# Patient Record
Sex: FEMALE | Race: WHITE | Employment: FULL TIME | URBAN - METROPOLITAN AREA
[De-identification: names, ages, dates, MRNs, and addresses within clinical notes are randomized per-mention and may not be internally consistent; named-entity substitution may affect disease eponyms.]

---

## 2018-07-30 ENCOUNTER — LAB ENCOUNTER (OUTPATIENT)
Dept: LAB | Age: 33
End: 2018-07-30
Attending: OBSTETRICS & GYNECOLOGY
Payer: COMMERCIAL

## 2018-07-30 DIAGNOSIS — Z01.419 PAP SMEAR, LOW-RISK: ICD-10-CM

## 2018-07-30 PROCEDURE — 88175 CYTOPATH C/V AUTO FLUID REDO: CPT

## 2018-07-30 PROCEDURE — 87624 HPV HI-RISK TYP POOLED RSLT: CPT

## 2018-08-01 LAB — HPV I/H RISK 1 DNA SPEC QL NAA+PROBE: NEGATIVE

## 2018-08-02 PROBLEM — J30.9 ALLERGIC RHINITIS: Status: ACTIVE | Noted: 2018-08-02

## 2018-08-02 PROBLEM — R09.81 NASAL CONGESTION: Status: ACTIVE | Noted: 2018-08-02

## 2018-08-02 PROBLEM — J38.2 VOCAL NODULES IN ADULTS: Status: ACTIVE | Noted: 2018-08-02

## 2019-01-29 PROBLEM — Z90.89 H/O ADENOIDECTOMY: Status: ACTIVE | Noted: 2019-01-29

## 2019-01-29 PROBLEM — J34.2 DEVIATED SEPTUM: Status: ACTIVE | Noted: 2019-01-29

## 2019-01-29 PROBLEM — Z98.890 S/P ACL SURGERY: Status: ACTIVE | Noted: 2019-01-29

## 2019-02-27 ENCOUNTER — APPOINTMENT (OUTPATIENT)
Dept: LAB | Age: 34
End: 2019-02-27
Attending: NURSE PRACTITIONER
Payer: COMMERCIAL

## 2019-02-27 DIAGNOSIS — E28.8 OTHER OVARIAN DYSFUNCTION: ICD-10-CM

## 2019-02-27 LAB
ESTRADIOL SERPL-MCNC: 54.6 PG/ML
FSH SERPL-ACNC: 5.1 MIU/ML
LH SERPL-ACNC: 5.6 MIU/ML
PROLACTIN SERPL-MCNC: 13.3 NG/ML

## 2019-02-27 PROCEDURE — 82670 ASSAY OF TOTAL ESTRADIOL: CPT

## 2019-02-27 PROCEDURE — 36415 COLL VENOUS BLD VENIPUNCTURE: CPT

## 2019-02-27 PROCEDURE — 84146 ASSAY OF PROLACTIN: CPT

## 2019-02-27 PROCEDURE — 83002 ASSAY OF GONADOTROPIN (LH): CPT

## 2019-02-27 PROCEDURE — 83001 ASSAY OF GONADOTROPIN (FSH): CPT

## 2019-03-25 ENCOUNTER — LAB ENCOUNTER (OUTPATIENT)
Dept: LAB | Age: 34
End: 2019-03-25
Attending: NURSE PRACTITIONER
Payer: COMMERCIAL

## 2019-03-25 DIAGNOSIS — N97.9 FEMALE INFERTILITY: ICD-10-CM

## 2019-03-25 LAB — PROGEST SERPL-MCNC: 15.1 NG/ML

## 2019-03-25 PROCEDURE — 84144 ASSAY OF PROGESTERONE: CPT

## 2019-03-25 PROCEDURE — 36415 COLL VENOUS BLD VENIPUNCTURE: CPT

## 2019-03-25 NOTE — PROGRESS NOTES
Left message on voice mail to check \"MYChart\" to note progesterone level is normal. To call office if any questions.

## 2019-06-14 ENCOUNTER — HOSPITAL ENCOUNTER (OUTPATIENT)
Dept: GENERAL RADIOLOGY | Facility: HOSPITAL | Age: 34
Discharge: HOME | End: 2019-06-14
Attending: OBSTETRICS & GYNECOLOGY
Payer: COMMERCIAL

## 2019-06-14 ENCOUNTER — APPOINTMENT (OUTPATIENT)
Dept: LAB | Facility: HOSPITAL | Age: 34
End: 2019-06-14
Attending: OBSTETRICS & GYNECOLOGY
Payer: COMMERCIAL

## 2019-06-14 DIAGNOSIS — N97.1 TUBAL OCCLUSION: ICD-10-CM

## 2019-06-14 PROCEDURE — 58340 CATHETER FOR HYSTEROGRAPHY: CPT | Performed by: OBSTETRICS & GYNECOLOGY

## 2019-06-14 PROCEDURE — 74740 X-RAY FEMALE GENITAL TRACT: CPT | Performed by: OBSTETRICS & GYNECOLOGY

## 2019-08-02 ENCOUNTER — APPOINTMENT (OUTPATIENT)
Dept: LAB | Age: 34
End: 2019-08-02
Attending: OBSTETRICS & GYNECOLOGY
Payer: COMMERCIAL

## 2019-08-02 DIAGNOSIS — E28.9 OVARIAN DYSFUNCTION: ICD-10-CM

## 2019-08-02 LAB — PROGEST SERPL-MCNC: 10.5 NG/ML

## 2019-08-02 PROCEDURE — 36415 COLL VENOUS BLD VENIPUNCTURE: CPT

## 2019-08-02 PROCEDURE — 84144 ASSAY OF PROGESTERONE: CPT

## 2019-08-30 ENCOUNTER — APPOINTMENT (OUTPATIENT)
Dept: LAB | Age: 34
End: 2019-08-30
Attending: OBSTETRICS & GYNECOLOGY
Payer: COMMERCIAL

## 2019-08-30 DIAGNOSIS — E28.8 OTHER OVARIAN DYSFUNCTION: ICD-10-CM

## 2019-08-30 LAB — PROGEST SERPL-MCNC: 21.1 NG/ML

## 2019-08-30 PROCEDURE — 84144 ASSAY OF PROGESTERONE: CPT

## 2019-08-30 PROCEDURE — 36415 COLL VENOUS BLD VENIPUNCTURE: CPT

## 2019-10-02 ENCOUNTER — APPOINTMENT (OUTPATIENT)
Dept: LAB | Age: 34
End: 2019-10-02
Attending: OBSTETRICS & GYNECOLOGY
Payer: COMMERCIAL

## 2019-10-02 DIAGNOSIS — E28.8 OTHER OVARIAN DYSFUNCTION: ICD-10-CM

## 2019-10-02 PROCEDURE — 36415 COLL VENOUS BLD VENIPUNCTURE: CPT

## 2019-10-02 PROCEDURE — 84144 ASSAY OF PROGESTERONE: CPT

## 2019-10-28 PROCEDURE — 87591 N.GONORRHOEAE DNA AMP PROB: CPT | Performed by: NURSE PRACTITIONER

## 2019-10-28 PROCEDURE — 87624 HPV HI-RISK TYP POOLED RSLT: CPT | Performed by: NURSE PRACTITIONER

## 2019-10-28 PROCEDURE — 87491 CHLMYD TRACH DNA AMP PROBE: CPT | Performed by: NURSE PRACTITIONER

## 2019-10-28 PROCEDURE — 88175 CYTOPATH C/V AUTO FLUID REDO: CPT | Performed by: NURSE PRACTITIONER

## 2019-11-14 ENCOUNTER — APPOINTMENT (OUTPATIENT)
Dept: LAB | Age: 34
End: 2019-11-14
Attending: OBSTETRICS & GYNECOLOGY
Payer: COMMERCIAL

## 2019-11-14 DIAGNOSIS — E28.9 OVARIAN DYSFUNCTION: ICD-10-CM

## 2019-11-14 PROCEDURE — 36415 COLL VENOUS BLD VENIPUNCTURE: CPT

## 2019-11-14 PROCEDURE — 84144 ASSAY OF PROGESTERONE: CPT

## 2020-04-22 ENCOUNTER — E-VISIT (OUTPATIENT)
Dept: FAMILY MEDICINE CLINIC | Facility: CLINIC | Age: 35
End: 2020-04-22

## 2020-04-22 DIAGNOSIS — J02.9 SORE THROAT: Primary | ICD-10-CM

## 2020-04-22 PROCEDURE — 99421 OL DIG E/M SVC 5-10 MIN: CPT | Performed by: NURSE PRACTITIONER

## 2020-04-22 RX ORDER — AMOXICILLIN 500 MG/1
500 CAPSULE ORAL 2 TIMES DAILY
Qty: 20 CAPSULE | Refills: 0 | Status: SHIPPED | OUTPATIENT
Start: 2020-04-22 | End: 2020-05-02

## 2020-04-22 NOTE — PROGRESS NOTES
Lopez Fountain is a 36year old female. HPI:   See answers to questions above.      Current Outpatient Medications   Medication Sig Dispense Refill   • amoxicillin 500 MG Oral Cap Take 1 capsule (500 mg total) by mouth 2 (two) times daily for 10 days

## 2020-07-24 ENCOUNTER — LAB ENCOUNTER (OUTPATIENT)
Dept: LAB | Facility: HOSPITAL | Age: 35
End: 2020-07-24
Attending: OBSTETRICS & GYNECOLOGY
Payer: COMMERCIAL

## 2020-07-24 DIAGNOSIS — Z01.812 PRE-OPERATIVE LABORATORY EXAMINATION: ICD-10-CM

## 2020-07-25 LAB — SARS-COV-2 RNA RESP QL NAA+PROBE: NOT DETECTED

## 2020-09-18 ENCOUNTER — APPOINTMENT (OUTPATIENT)
Dept: LAB | Facility: HOSPITAL | Age: 35
End: 2020-09-18
Attending: OBSTETRICS & GYNECOLOGY
Payer: COMMERCIAL

## 2020-09-18 DIAGNOSIS — Z01.812 PRE-OPERATIVE LABORATORY EXAMINATION: ICD-10-CM

## 2020-09-19 LAB — SARS-COV-2 RNA RESP QL NAA+PROBE: NOT DETECTED

## 2020-11-12 PROCEDURE — 87491 CHLMYD TRACH DNA AMP PROBE: CPT | Performed by: OBSTETRICS & GYNECOLOGY

## 2020-11-12 PROCEDURE — 87591 N.GONORRHOEAE DNA AMP PROB: CPT | Performed by: OBSTETRICS & GYNECOLOGY

## 2020-11-12 PROCEDURE — 87086 URINE CULTURE/COLONY COUNT: CPT | Performed by: OBSTETRICS & GYNECOLOGY

## 2020-12-10 PROBLEM — O09.819 PREGNANCY RESULTING FROM ASSISTED REPRODUCTIVE TECHNOLOGY: Status: ACTIVE | Noted: 2020-12-10

## 2020-12-10 PROBLEM — O09.819 PREGNANCY RESULTING FROM ASSISTED REPRODUCTIVE TECHNOLOGY (HCC): Status: ACTIVE | Noted: 2020-12-10

## 2021-01-08 ENCOUNTER — LAB ENCOUNTER (OUTPATIENT)
Dept: LAB | Age: 36
End: 2021-01-08
Attending: OBSTETRICS & GYNECOLOGY
Payer: COMMERCIAL

## 2021-01-08 DIAGNOSIS — Z34.01 ENCOUNTER FOR SUPERVISION OF NORMAL FIRST PREGNANCY IN FIRST TRIMESTER: ICD-10-CM

## 2021-01-08 LAB
ANTIBODY SCREEN: NEGATIVE
BASOPHILS # BLD AUTO: 0.05 X10(3) UL (ref 0–0.2)
BASOPHILS NFR BLD AUTO: 0.4 %
DEPRECATED RDW RBC AUTO: 45.8 FL (ref 35.1–46.3)
EOSINOPHIL # BLD AUTO: 0.15 X10(3) UL (ref 0–0.7)
EOSINOPHIL NFR BLD AUTO: 1.3 %
ERYTHROCYTE [DISTWIDTH] IN BLOOD BY AUTOMATED COUNT: 14 % (ref 11–15)
HBV SURFACE AG SER-ACNC: <0.1 [IU]/L
HBV SURFACE AG SERPL QL IA: NONREACTIVE
HCT VFR BLD AUTO: 39.2 %
HGB BLD-MCNC: 12.9 G/DL
IMM GRANULOCYTES # BLD AUTO: 0.05 X10(3) UL (ref 0–1)
IMM GRANULOCYTES NFR BLD: 0.4 %
LYMPHOCYTES # BLD AUTO: 2.19 X10(3) UL (ref 1–4)
LYMPHOCYTES NFR BLD AUTO: 19.3 %
MCH RBC QN AUTO: 29.4 PG (ref 26–34)
MCHC RBC AUTO-ENTMCNC: 32.9 G/DL (ref 31–37)
MCV RBC AUTO: 89.3 FL
MONOCYTES # BLD AUTO: 0.82 X10(3) UL (ref 0.1–1)
MONOCYTES NFR BLD AUTO: 7.2 %
NEUTROPHILS # BLD AUTO: 8.07 X10 (3) UL (ref 1.5–7.7)
NEUTROPHILS # BLD AUTO: 8.07 X10(3) UL (ref 1.5–7.7)
NEUTROPHILS NFR BLD AUTO: 71.4 %
PLATELET # BLD AUTO: 271 10(3)UL (ref 150–450)
RBC # BLD AUTO: 4.39 X10(6)UL
RH BLOOD TYPE: POSITIVE
RUBV IGG SER QL: POSITIVE
RUBV IGG SER-ACNC: 18.5 IU/ML (ref 10–?)
T PALLIDUM AB SER QL IA: NONREACTIVE
WBC # BLD AUTO: 11.3 X10(3) UL (ref 4–11)

## 2021-01-08 PROCEDURE — 86900 BLOOD TYPING SEROLOGIC ABO: CPT

## 2021-01-08 PROCEDURE — 87340 HEPATITIS B SURFACE AG IA: CPT

## 2021-01-08 PROCEDURE — 87389 HIV-1 AG W/HIV-1&-2 AB AG IA: CPT

## 2021-01-08 PROCEDURE — 36415 COLL VENOUS BLD VENIPUNCTURE: CPT

## 2021-01-08 PROCEDURE — 86780 TREPONEMA PALLIDUM: CPT

## 2021-01-08 PROCEDURE — 86762 RUBELLA ANTIBODY: CPT

## 2021-01-08 PROCEDURE — 86850 RBC ANTIBODY SCREEN: CPT

## 2021-01-08 PROCEDURE — 86901 BLOOD TYPING SEROLOGIC RH(D): CPT

## 2021-01-08 PROCEDURE — 85025 COMPLETE CBC W/AUTO DIFF WBC: CPT

## 2021-03-01 ENCOUNTER — LAB ENCOUNTER (OUTPATIENT)
Dept: LAB | Age: 36
End: 2021-03-01
Attending: OBSTETRICS & GYNECOLOGY
Payer: COMMERCIAL

## 2021-03-01 DIAGNOSIS — O09.812 SUPERVISION OF PREGNANCY RESULTING FROM ASSISTED REPRODUCTIVE TECHNOLOGY IN SECOND TRIMESTER: ICD-10-CM

## 2021-03-01 LAB
BASOPHILS # BLD AUTO: 0.06 X10(3) UL (ref 0–0.2)
BASOPHILS NFR BLD AUTO: 0.5 %
DEPRECATED RDW RBC AUTO: 44 FL (ref 35.1–46.3)
EOSINOPHIL # BLD AUTO: 0.13 X10(3) UL (ref 0–0.7)
EOSINOPHIL NFR BLD AUTO: 1.1 %
ERYTHROCYTE [DISTWIDTH] IN BLOOD BY AUTOMATED COUNT: 13.2 % (ref 11–15)
GLUCOSE 1H P GLC SERPL-MCNC: 133 MG/DL
HCT VFR BLD AUTO: 38.1 %
HGB BLD-MCNC: 12.4 G/DL
IMM GRANULOCYTES # BLD AUTO: 0.1 X10(3) UL (ref 0–1)
IMM GRANULOCYTES NFR BLD: 0.8 %
LYMPHOCYTES # BLD AUTO: 2.02 X10(3) UL (ref 1–4)
LYMPHOCYTES NFR BLD AUTO: 16.4 %
MCH RBC QN AUTO: 30 PG (ref 26–34)
MCHC RBC AUTO-ENTMCNC: 32.5 G/DL (ref 31–37)
MCV RBC AUTO: 92 FL
MONOCYTES # BLD AUTO: 0.7 X10(3) UL (ref 0.1–1)
MONOCYTES NFR BLD AUTO: 5.7 %
NEUTROPHILS # BLD AUTO: 9.28 X10 (3) UL (ref 1.5–7.7)
NEUTROPHILS # BLD AUTO: 9.28 X10(3) UL (ref 1.5–7.7)
NEUTROPHILS NFR BLD AUTO: 75.5 %
PLATELET # BLD AUTO: 280 10(3)UL (ref 150–450)
RBC # BLD AUTO: 4.14 X10(6)UL
WBC # BLD AUTO: 12.3 X10(3) UL (ref 4–11)

## 2021-03-01 PROCEDURE — 85025 COMPLETE CBC W/AUTO DIFF WBC: CPT

## 2021-03-01 PROCEDURE — 36415 COLL VENOUS BLD VENIPUNCTURE: CPT

## 2021-03-01 PROCEDURE — 82950 GLUCOSE TEST: CPT

## 2021-03-13 DIAGNOSIS — Z23 NEED FOR VACCINATION: ICD-10-CM

## 2021-05-13 ENCOUNTER — LAB ENCOUNTER (OUTPATIENT)
Dept: LAB | Age: 36
End: 2021-05-13
Attending: OBSTETRICS & GYNECOLOGY
Payer: COMMERCIAL

## 2021-05-13 DIAGNOSIS — Z34.03 ENCOUNTER FOR SUPERVISION OF NORMAL FIRST PREGNANCY IN THIRD TRIMESTER: ICD-10-CM

## 2021-05-13 PROCEDURE — 87653 STREP B DNA AMP PROBE: CPT | Performed by: OBSTETRICS & GYNECOLOGY

## 2021-05-13 PROCEDURE — 36415 COLL VENOUS BLD VENIPUNCTURE: CPT

## 2021-05-13 PROCEDURE — 87389 HIV-1 AG W/HIV-1&-2 AB AG IA: CPT

## 2021-05-13 PROCEDURE — 87081 CULTURE SCREEN ONLY: CPT | Performed by: OBSTETRICS & GYNECOLOGY

## 2021-05-27 ENCOUNTER — HOSPITAL ENCOUNTER (OUTPATIENT)
Facility: HOSPITAL | Age: 36
Setting detail: OBSERVATION
Discharge: HOME | End: 2021-05-27
Attending: OBSTETRICS & GYNECOLOGY | Admitting: OBSTETRICS & GYNECOLOGY
Payer: COMMERCIAL

## 2021-05-27 VITALS
HEART RATE: 62 BPM | HEIGHT: 65 IN | TEMPERATURE: 98 F | DIASTOLIC BLOOD PRESSURE: 79 MMHG | SYSTOLIC BLOOD PRESSURE: 118 MMHG | RESPIRATION RATE: 18 BRPM | WEIGHT: 171 LBS | BODY MASS INDEX: 28.49 KG/M2

## 2021-05-27 PROBLEM — Z34.90 PREGNANCY: Status: ACTIVE | Noted: 2021-05-27

## 2021-05-27 PROBLEM — Z34.90 PREGNANCY (HCC): Status: ACTIVE | Noted: 2021-05-27

## 2021-05-27 PROCEDURE — 59025 FETAL NON-STRESS TEST: CPT

## 2021-05-27 PROCEDURE — 80053 COMPREHEN METABOLIC PANEL: CPT | Performed by: OBSTETRICS & GYNECOLOGY

## 2021-05-27 PROCEDURE — 84156 ASSAY OF PROTEIN URINE: CPT | Performed by: OBSTETRICS & GYNECOLOGY

## 2021-05-27 PROCEDURE — 82570 ASSAY OF URINE CREATININE: CPT | Performed by: OBSTETRICS & GYNECOLOGY

## 2021-05-27 PROCEDURE — 85025 COMPLETE CBC W/AUTO DIFF WBC: CPT | Performed by: OBSTETRICS & GYNECOLOGY

## 2021-05-27 PROCEDURE — 84550 ASSAY OF BLOOD/URIC ACID: CPT | Performed by: OBSTETRICS & GYNECOLOGY

## 2021-05-27 PROCEDURE — 36415 COLL VENOUS BLD VENIPUNCTURE: CPT

## 2021-05-27 PROCEDURE — 99213 OFFICE O/P EST LOW 20 MIN: CPT

## 2021-05-27 NOTE — NST
Nonstress Test   Patient: Rawland Epley Mitchem    Gestation: 38w0d    NST:       Variability: Moderate           Accelerations: Yes           Decelerations: None            Baseline: 135 BPM           Uterine Irritability: No           Contractions: Regula

## 2021-05-27 NOTE — PROGRESS NOTES
Pt is a 30year old female admitted to TRG2/TRG2-A. Patient presents with:  R/o Pih: 2+protein in urine at doctor office   Pt denies blurred vision, headache or epigastric pain. Efm applied. Pt is  38w0d intra-uterine pregnancy.   History obtained, c

## 2021-06-01 ENCOUNTER — LAB ENCOUNTER (OUTPATIENT)
Dept: LAB | Facility: HOSPITAL | Age: 36
End: 2021-06-01
Attending: NURSE PRACTITIONER
Payer: COMMERCIAL

## 2021-06-01 DIAGNOSIS — O09.523 AMA (ADVANCED MATERNAL AGE) MULTIGRAVIDA 35+, THIRD TRIMESTER: ICD-10-CM

## 2021-06-01 DIAGNOSIS — R80.9 PROTEINURIA, UNSPECIFIED TYPE: ICD-10-CM

## 2021-06-01 PROCEDURE — 82570 ASSAY OF URINE CREATININE: CPT

## 2021-06-01 PROCEDURE — 36415 COLL VENOUS BLD VENIPUNCTURE: CPT

## 2021-06-01 PROCEDURE — 84156 ASSAY OF PROTEIN URINE: CPT

## 2021-06-01 PROCEDURE — 85025 COMPLETE CBC W/AUTO DIFF WBC: CPT

## 2021-06-02 ENCOUNTER — HOSPITAL ENCOUNTER (INPATIENT)
Facility: HOSPITAL | Age: 36
LOS: 5 days | Discharge: HOME | End: 2021-06-07
Attending: OBSTETRICS & GYNECOLOGY | Admitting: OBSTETRICS & GYNECOLOGY
Payer: COMMERCIAL

## 2021-06-02 ENCOUNTER — APPOINTMENT (OUTPATIENT)
Dept: OBGYN CLINIC | Facility: HOSPITAL | Age: 36
End: 2021-06-02
Payer: COMMERCIAL

## 2021-06-02 PROCEDURE — 3E0P7VZ INTRODUCTION OF HORMONE INTO FEMALE REPRODUCTIVE, VIA NATURAL OR ARTIFICIAL OPENING: ICD-10-PCS | Performed by: OBSTETRICS & GYNECOLOGY

## 2021-06-02 RX ORDER — DEXTROSE, SODIUM CHLORIDE, SODIUM LACTATE, POTASSIUM CHLORIDE, AND CALCIUM CHLORIDE 5; .6; .31; .03; .02 G/100ML; G/100ML; G/100ML; G/100ML; G/100ML
INJECTION, SOLUTION INTRAVENOUS AS NEEDED
Status: DISCONTINUED | OUTPATIENT
Start: 2021-06-02 | End: 2021-06-04

## 2021-06-02 RX ORDER — NALBUPHINE HCL 10 MG/ML
2.5 AMPUL (ML) INJECTION
Status: DISCONTINUED | OUTPATIENT
Start: 2021-06-02 | End: 2021-06-04

## 2021-06-02 RX ORDER — ONDANSETRON 2 MG/ML
4 INJECTION INTRAMUSCULAR; INTRAVENOUS EVERY 6 HOURS PRN
Status: DISCONTINUED | OUTPATIENT
Start: 2021-06-02 | End: 2021-06-04

## 2021-06-02 RX ORDER — AMMONIA INHALANTS 0.04 G/.3ML
0.3 INHALANT RESPIRATORY (INHALATION) AS NEEDED
Status: DISCONTINUED | OUTPATIENT
Start: 2021-06-02 | End: 2021-06-04

## 2021-06-02 RX ORDER — BUPIVACAINE HCL/0.9 % NACL/PF 0.25 %
5 PLASTIC BAG, INJECTION (ML) EPIDURAL AS NEEDED
Status: DISCONTINUED | OUTPATIENT
Start: 2021-06-02 | End: 2021-06-04

## 2021-06-02 RX ORDER — HYDROMORPHONE HYDROCHLORIDE 1 MG/ML
1 INJECTION, SOLUTION INTRAMUSCULAR; INTRAVENOUS; SUBCUTANEOUS EVERY 2 HOUR PRN
Status: DISCONTINUED | OUTPATIENT
Start: 2021-06-02 | End: 2021-06-04

## 2021-06-02 RX ORDER — SODIUM CHLORIDE, SODIUM LACTATE, POTASSIUM CHLORIDE, CALCIUM CHLORIDE 600; 310; 30; 20 MG/100ML; MG/100ML; MG/100ML; MG/100ML
INJECTION, SOLUTION INTRAVENOUS CONTINUOUS
Status: DISCONTINUED | OUTPATIENT
Start: 2021-06-02 | End: 2021-06-04

## 2021-06-02 RX ORDER — ZOLPIDEM TARTRATE 5 MG/1
5 TABLET ORAL NIGHTLY PRN
Status: DISCONTINUED | OUTPATIENT
Start: 2021-06-02 | End: 2021-06-04

## 2021-06-02 RX ORDER — ACETAMINOPHEN 500 MG
500 TABLET ORAL EVERY 6 HOURS PRN
Status: DISCONTINUED | OUTPATIENT
Start: 2021-06-02 | End: 2021-06-04

## 2021-06-02 RX ORDER — TERBUTALINE SULFATE 1 MG/ML
0.25 INJECTION, SOLUTION SUBCUTANEOUS AS NEEDED
Status: DISCONTINUED | OUTPATIENT
Start: 2021-06-02 | End: 2021-06-04

## 2021-06-02 RX ORDER — TRISODIUM CITRATE DIHYDRATE AND CITRIC ACID MONOHYDRATE 500; 334 MG/5ML; MG/5ML
30 SOLUTION ORAL AS NEEDED
Status: COMPLETED | OUTPATIENT
Start: 2021-06-02 | End: 2021-06-04

## 2021-06-02 RX ORDER — IBUPROFEN 600 MG/1
600 TABLET ORAL EVERY 6 HOURS PRN
Status: DISCONTINUED | OUTPATIENT
Start: 2021-06-02 | End: 2021-06-04

## 2021-06-03 PROCEDURE — 3E033VJ INTRODUCTION OF OTHER HORMONE INTO PERIPHERAL VEIN, PERCUTANEOUS APPROACH: ICD-10-PCS | Performed by: OBSTETRICS & GYNECOLOGY

## 2021-06-03 NOTE — PROGRESS NOTES
Report received, assume pt care. Pt assessment completed. Pt resting comfortably at this time, denies needs.

## 2021-06-03 NOTE — PROGRESS NOTES
S: pt somewhat uncomfortable  O: afeb , BP-116/54  SVE: FTP/60/-2, midposition  FHTs 135-mod variability    A/P:  Will stop oxytocin. Allow pt to eat. Then begin cytotec for the night.

## 2021-06-03 NOTE — PROGRESS NOTES
Pt is a 30year old female admitted to 106/-A. Patient presents with:  Scheduled Induction     Pt is  38w6d intra-uterine pregnancy. History obtained, consents signed. Oriented to room, staff, and plan of care.

## 2021-06-03 NOTE — H&P
[address] [facility] Patient Status:  Inpatient    10/8/1985 MRN [id number]   Location [facility] [facility] Attending Daisy Blackmon MD   Hosp Day # 1 PCP ELOISE CHANDRA,      Subjective:  Yossi Bach summary    Assessment/Plan:    IUP at 39+0 weeks, BP s stable2  Elevated pro/Cr ratio. Risks, benefits, alternatives and possible complications have been discussed in detail with the patient.   Pre-admission, admission, and post admission procedures

## 2021-06-04 ENCOUNTER — ANESTHESIA (OUTPATIENT)
Dept: OBGYN UNIT | Facility: HOSPITAL | Age: 36
End: 2021-06-04
Payer: COMMERCIAL

## 2021-06-04 ENCOUNTER — ANESTHESIA EVENT (OUTPATIENT)
Dept: OBGYN UNIT | Facility: HOSPITAL | Age: 36
End: 2021-06-04
Payer: COMMERCIAL

## 2021-06-04 PROCEDURE — 10907ZC DRAINAGE OF AMNIOTIC FLUID, THERAPEUTIC FROM PRODUCTS OF CONCEPTION, VIA NATURAL OR ARTIFICIAL OPENING: ICD-10-PCS | Performed by: OBSTETRICS & GYNECOLOGY

## 2021-06-04 PROCEDURE — 59514 CESAREAN DELIVERY ONLY: CPT | Performed by: OBSTETRICS & GYNECOLOGY

## 2021-06-04 DEVICE — INTERCEED: Type: IMPLANTABLE DEVICE | Status: FUNCTIONAL

## 2021-06-04 RX ORDER — ZOLPIDEM TARTRATE 5 MG/1
5 TABLET ORAL NIGHTLY PRN
Status: DISCONTINUED | OUTPATIENT
Start: 2021-06-04 | End: 2021-06-07

## 2021-06-04 RX ORDER — DOCUSATE SODIUM 100 MG/1
100 CAPSULE, LIQUID FILLED ORAL
Status: DISCONTINUED | OUTPATIENT
Start: 2021-06-05 | End: 2021-06-05

## 2021-06-04 RX ORDER — DIPHENHYDRAMINE HYDROCHLORIDE 50 MG/ML
25 INJECTION INTRAMUSCULAR; INTRAVENOUS ONCE AS NEEDED
Status: DISCONTINUED | OUTPATIENT
Start: 2021-06-04 | End: 2021-06-04 | Stop reason: HOSPADM

## 2021-06-04 RX ORDER — DIPHENHYDRAMINE HCL 25 MG
25 CAPSULE ORAL EVERY 4 HOURS PRN
Status: DISCONTINUED | OUTPATIENT
Start: 2021-06-04 | End: 2021-06-07

## 2021-06-04 RX ORDER — MISOPROSTOL 200 UG/1
1000 TABLET ORAL ONCE AS NEEDED
Status: ACTIVE | OUTPATIENT
Start: 2021-06-04 | End: 2021-06-04

## 2021-06-04 RX ORDER — SIMETHICONE 80 MG
80 TABLET,CHEWABLE ORAL DAILY PRN
Status: DISCONTINUED | OUTPATIENT
Start: 2021-06-04 | End: 2021-06-07

## 2021-06-04 RX ORDER — CEFAZOLIN SODIUM/WATER 2 G/20 ML
SYRINGE (ML) INTRAVENOUS
Status: DISPENSED
Start: 2021-06-04 | End: 2021-06-05

## 2021-06-04 RX ORDER — KETOROLAC TROMETHAMINE 30 MG/ML
30 INJECTION, SOLUTION INTRAMUSCULAR; INTRAVENOUS ONCE AS NEEDED
Status: DISCONTINUED | OUTPATIENT
Start: 2021-06-04 | End: 2021-06-04 | Stop reason: HOSPADM

## 2021-06-04 RX ORDER — DIPHENHYDRAMINE HYDROCHLORIDE 50 MG/ML
12.5 INJECTION INTRAMUSCULAR; INTRAVENOUS EVERY 4 HOURS PRN
Status: DISCONTINUED | OUTPATIENT
Start: 2021-06-04 | End: 2021-06-07

## 2021-06-04 RX ORDER — MORPHINE SULFATE 0.5 MG/ML
2 INJECTION, SOLUTION EPIDURAL; INTRATHECAL; INTRAVENOUS ONCE
Status: DISCONTINUED | OUTPATIENT
Start: 2021-06-04 | End: 2021-06-04

## 2021-06-04 RX ORDER — NALBUPHINE HCL 10 MG/ML
2.5 AMPUL (ML) INJECTION EVERY 4 HOURS PRN
Status: DISCONTINUED | OUTPATIENT
Start: 2021-06-04 | End: 2021-06-07

## 2021-06-04 RX ORDER — METOCLOPRAMIDE HYDROCHLORIDE 5 MG/ML
10 INJECTION INTRAMUSCULAR; INTRAVENOUS EVERY 6 HOURS PRN
Status: DISCONTINUED | OUTPATIENT
Start: 2021-06-04 | End: 2021-06-07

## 2021-06-04 RX ORDER — KETOROLAC TROMETHAMINE 30 MG/ML
30 INJECTION, SOLUTION INTRAMUSCULAR; INTRAVENOUS EVERY 6 HOURS
Status: COMPLETED | OUTPATIENT
Start: 2021-06-04 | End: 2021-06-05

## 2021-06-04 RX ORDER — SODIUM CHLORIDE, SODIUM LACTATE, POTASSIUM CHLORIDE, CALCIUM CHLORIDE 600; 310; 30; 20 MG/100ML; MG/100ML; MG/100ML; MG/100ML
INJECTION, SOLUTION INTRAVENOUS CONTINUOUS
Status: DISCONTINUED | OUTPATIENT
Start: 2021-06-04 | End: 2021-06-07

## 2021-06-04 RX ORDER — LIDOCAINE HYDROCHLORIDE AND EPINEPHRINE 15; 5 MG/ML; UG/ML
INJECTION, SOLUTION EPIDURAL AS NEEDED
Status: DISCONTINUED | OUTPATIENT
Start: 2021-06-04 | End: 2021-06-04 | Stop reason: SURG

## 2021-06-04 RX ORDER — ACETAMINOPHEN 500 MG
1000 TABLET ORAL EVERY 6 HOURS
Status: DISCONTINUED | OUTPATIENT
Start: 2021-06-04 | End: 2021-06-07

## 2021-06-04 RX ORDER — IBUPROFEN 600 MG/1
600 TABLET ORAL EVERY 6 HOURS
Status: DISCONTINUED | OUTPATIENT
Start: 2021-06-05 | End: 2021-06-05

## 2021-06-04 RX ORDER — MORPHINE SULFATE 2 MG/ML
INJECTION, SOLUTION INTRAMUSCULAR; INTRAVENOUS AS NEEDED
Status: DISCONTINUED | OUTPATIENT
Start: 2021-06-04 | End: 2021-06-04 | Stop reason: SURG

## 2021-06-04 RX ORDER — LIDOCAINE HYDROCHLORIDE AND EPINEPHRINE 20; 5 MG/ML; UG/ML
INJECTION, SOLUTION EPIDURAL; INFILTRATION; INTRACAUDAL; PERINEURAL AS NEEDED
Status: DISCONTINUED | OUTPATIENT
Start: 2021-06-04 | End: 2021-06-04 | Stop reason: SURG

## 2021-06-04 RX ORDER — ONDANSETRON 2 MG/ML
4 INJECTION INTRAMUSCULAR; INTRAVENOUS ONCE AS NEEDED
Status: DISCONTINUED | OUTPATIENT
Start: 2021-06-04 | End: 2021-06-04 | Stop reason: HOSPADM

## 2021-06-04 RX ORDER — GABAPENTIN 300 MG/1
300 CAPSULE ORAL EVERY 8 HOURS PRN
Status: DISCONTINUED | OUTPATIENT
Start: 2021-06-04 | End: 2021-06-07

## 2021-06-04 RX ORDER — CEFAZOLIN SODIUM/WATER 2 G/20 ML
2 SYRINGE (ML) INTRAVENOUS ONCE
Status: COMPLETED | OUTPATIENT
Start: 2021-06-04 | End: 2021-06-04

## 2021-06-04 RX ORDER — KETOROLAC TROMETHAMINE 30 MG/ML
INJECTION, SOLUTION INTRAMUSCULAR; INTRAVENOUS
Status: COMPLETED
Start: 2021-06-04 | End: 2021-06-04

## 2021-06-04 RX ORDER — BISACODYL 10 MG
10 SUPPOSITORY, RECTAL RECTAL
Status: DISCONTINUED | OUTPATIENT
Start: 2021-06-04 | End: 2021-06-07

## 2021-06-04 RX ORDER — ONDANSETRON 2 MG/ML
4 INJECTION INTRAMUSCULAR; INTRAVENOUS EVERY 6 HOURS PRN
Status: DISCONTINUED | OUTPATIENT
Start: 2021-06-04 | End: 2021-06-07

## 2021-06-04 RX ORDER — DEXTROSE, SODIUM CHLORIDE, SODIUM LACTATE, POTASSIUM CHLORIDE, AND CALCIUM CHLORIDE 5; .6; .31; .03; .02 G/100ML; G/100ML; G/100ML; G/100ML; G/100ML
INJECTION, SOLUTION INTRAVENOUS CONTINUOUS PRN
Status: DISCONTINUED | OUTPATIENT
Start: 2021-06-04 | End: 2021-06-07

## 2021-06-04 RX ORDER — NALBUPHINE HCL 10 MG/ML
2.5 AMPUL (ML) INJECTION
Status: DISCONTINUED | OUTPATIENT
Start: 2021-06-04 | End: 2021-06-04 | Stop reason: HOSPADM

## 2021-06-04 RX ORDER — NALOXONE HYDROCHLORIDE 0.4 MG/ML
0.08 INJECTION, SOLUTION INTRAMUSCULAR; INTRAVENOUS; SUBCUTANEOUS
Status: ACTIVE | OUTPATIENT
Start: 2021-06-04 | End: 2021-06-05

## 2021-06-04 RX ADMIN — SODIUM CHLORIDE, SODIUM LACTATE, POTASSIUM CHLORIDE, CALCIUM CHLORIDE: 600; 310; 30; 20 INJECTION, SOLUTION INTRAVENOUS at 19:21:00

## 2021-06-04 RX ADMIN — CEFAZOLIN SODIUM/WATER 2 G: 2 G/20 ML SYRINGE (ML) INTRAVENOUS at 19:01:00

## 2021-06-04 RX ADMIN — SODIUM CHLORIDE, SODIUM LACTATE, POTASSIUM CHLORIDE, CALCIUM CHLORIDE: 600; 310; 30; 20 INJECTION, SOLUTION INTRAVENOUS at 19:52:00

## 2021-06-04 RX ADMIN — SODIUM CHLORIDE, SODIUM LACTATE, POTASSIUM CHLORIDE, CALCIUM CHLORIDE: 600; 310; 30; 20 INJECTION, SOLUTION INTRAVENOUS at 18:57:00

## 2021-06-04 RX ADMIN — LIDOCAINE HYDROCHLORIDE AND EPINEPHRINE 5 ML: 20; 5 INJECTION, SOLUTION EPIDURAL; INFILTRATION; INTRACAUDAL; PERINEURAL at 19:08:00

## 2021-06-04 RX ADMIN — LIDOCAINE HYDROCHLORIDE AND EPINEPHRINE 3 ML: 15; 5 INJECTION, SOLUTION EPIDURAL at 10:25:00

## 2021-06-04 RX ADMIN — LIDOCAINE HYDROCHLORIDE AND EPINEPHRINE 5 ML: 20; 5 INJECTION, SOLUTION EPIDURAL; INFILTRATION; INTRACAUDAL; PERINEURAL at 18:57:00

## 2021-06-04 RX ADMIN — MORPHINE SULFATE 2 MG: 2 INJECTION, SOLUTION INTRAMUSCULAR; INTRAVENOUS at 19:00:00

## 2021-06-04 RX ADMIN — LIDOCAINE HYDROCHLORIDE AND EPINEPHRINE 5 ML: 20; 5 INJECTION, SOLUTION EPIDURAL; INFILTRATION; INTRACAUDAL; PERINEURAL at 19:03:00

## 2021-06-04 NOTE — PROGRESS NOTES
Contractions increasing in intensity today day 2 of her induction. Blood pressures continue to be normal she is having no or preeclamptic symptoms and she is requesting analgesics.     Cervical exam the cervix is a loose 2/75%/ 0 station posterior and very

## 2021-06-04 NOTE — ANESTHESIA PROCEDURE NOTES
Labor Analgesia  Performed by: Chuckie Lamb MD  Authorized by: Chuckie Lamb MD       General Information and Staff    Start Time:  6/4/2021 10:13 AM  End Time:  6/4/2021 10:25 AM  Anesthesiologist:  Chuckie Lamb MD  Performed by:   Anesthesiologist  Shaina

## 2021-06-04 NOTE — PROGRESS NOTES
Patient pushing for nearly 3 hours with a reassuring tracing and increasing caput.   The head still is not engaged into the pelvis and between contraction does flow above the 0 station    We did apply the vacuum twice and when the halo had failed to move at

## 2021-06-04 NOTE — PROGRESS NOTES
Patient doing well pushing for over an hour the head is slightly asynclitic with a caput to just lateral to the occiput we attempted an internal manipulation of the head to correct this.     Tracing is reassuring with moderate variability rare decelerations

## 2021-06-04 NOTE — ANESTHESIA PREPROCEDURE EVALUATION
PRE-OP EVALUATION    Patient Name: Joaquina Gay    Admit Diagnosis: preg state  Pregnancy    Pre-op Diagnosis:  44 1/7 week IUP, failure to progress        Anesthesia Procedure: LABOR ANALGESIA    Surgery: primary     Surgeon: Dr. Meena Maciel Oral Cap, Take 1 capsule by mouth daily. , Disp: , Rfl: , 6/1/2021 at Unknown time        Allergies: Patient has no known allergies. Anesthesia Evaluation    Patient summary reviewed.     Anesthetic Complications  (+) history of anesthetic complications Date     06/02/2021    K 4.1 06/02/2021     06/02/2021    CO2 21.0 06/02/2021    BUN 14 06/02/2021    CREATSERUM 0.91 06/02/2021     (H) 06/02/2021    CA 8.7 06/02/2021            Airway      Mallampati: II       Cardiovascular    Cardio

## 2021-06-05 RX ORDER — DOCUSATE SODIUM 100 MG/1
100 CAPSULE, LIQUID FILLED ORAL
Status: DISCONTINUED | OUTPATIENT
Start: 2021-06-05 | End: 2021-06-07

## 2021-06-05 RX ORDER — IBUPROFEN 600 MG/1
600 TABLET ORAL EVERY 6 HOURS
Status: DISCONTINUED | OUTPATIENT
Start: 2021-06-05 | End: 2021-06-07

## 2021-06-05 NOTE — PROGRESS NOTES
[facility]  Post-Partum Caesarean Section Progress Note    Canelo Gay Patient Status:  Inpatient    10/8/1985 MRN [id number]   [facility] 1SW-J Attending Amy Rojas MD   Hosp Day # 3 PCP [address] [place] [address]

## 2021-06-05 NOTE — PLAN OF CARE
Problem: POSTPARTUM  Goal: Long Term Goal:Experiences normal postpartum course  Description: INTERVENTIONS:  - Assess and monitor vital signs and lab values. - Assess fundus and lochia. - Provide ice/sitz baths for perineum discomfort.   - Monitor heali pain/trauma. - Instruct and provide assistance with proper latch. - Review techniques for milk expression (breast pumping) and storage of breast milk. Provide pumping equipment/supplies, instructions and assistance, as needed.   - Encourage rooming-in and strengthening/mobility  - Encourage toileting schedule  Outcome: Progressing     Problem: GASTROINTESTINAL - ADULT  Goal: Minimal or absence of nausea and vomiting  Description: INTERVENTIONS:  - Maintain adequate hydration with IV or PO as ordered and alfred

## 2021-06-05 NOTE — PROGRESS NOTES
Labor Analgesia Follow Up Note    Patient underwent epidural anesthesia for labor analgesia,    Placenta Date/Time: 6/4/2021  7:16 PM    Delivery Date/Time[de-identified] 6/4/2021  7:14 PM    /77   Pulse 52   Temp 98.1 °F (36.7 °C)   Resp 16   Ht 1.626 m (5' 4\")

## 2021-06-05 NOTE — OPERATIVE REPORT
[facility]   Section - Operative Note    Anirudh Gay Patient Status:  Inpatient    10/8/1985 MRN [id number]   Location [facility] [facility] Attending Kourtney Collado MD   Hosp Day # 2 PCP [address] [place] [address],    Preoper The uterine cavity was swept clean using a wet lap. An extension of the incision was sustained due to the very low position of the babies head. This was closed from the bottom up to the incision in the left corner with running locking O Vicryl.    The f

## 2021-06-05 NOTE — ANESTHESIA POSTPROCEDURE EVALUATION
[address] [facility] Patient Status:  Inpatient   Age/Gender 30year old female MRN [id number]   Location 1818 [facility] Attending Andres Hewitt MD   Hosp Day # 2 PCP ELOISE CHANDRA, DO       Anesthesia Post-op Not

## 2021-06-06 RX ORDER — HYDROCODONE BITARTRATE AND ACETAMINOPHEN 5; 325 MG/1; MG/1
2 TABLET ORAL EVERY 4 HOURS PRN
Status: DISCONTINUED | OUTPATIENT
Start: 2021-06-06 | End: 2021-06-07

## 2021-06-06 RX ORDER — HYDROCODONE BITARTRATE AND ACETAMINOPHEN 5; 325 MG/1; MG/1
1 TABLET ORAL EVERY 4 HOURS PRN
Status: DISCONTINUED | OUTPATIENT
Start: 2021-06-06 | End: 2021-06-07

## 2021-06-06 NOTE — PROGRESS NOTES
[facility]  Post-Partum Caesarean Section Progress Note    Yessy Darin Gay Patient Status:  Inpatient    10/8/1985 MRN [id number]   [facility] 1SW-J Attending America Tong MD   Hosp Day # 4  [address]

## 2021-06-06 NOTE — PLAN OF CARE
Problem: SAFETY ADULT - FALL  Goal: Free from fall injury  Description: INTERVENTIONS:  - Assess pt frequently for physical needs  - Identify cognitive and physical deficits and behaviors that affect risk of falls.   - [place] fall precautions as indica for food preferences  - Enhance eating environment  Outcome: Completed  Goal: Achieves appropriate nutritional intake (bariatric)  Description: INTERVENTIONS:  - Monitor for over-consumption  - Identify factors contributing to increased intake, treat as ap Discuss/demonstrate breast feeding aids (e.g., infant sling, nursing footstool/pillows, and breast pumps). - Encourage mother/other family members to express feelings/concerns, and actively listen.   - Educate father/SO about benefits of breast feeding and

## 2021-06-07 VITALS
RESPIRATION RATE: 16 BRPM | WEIGHT: 170 LBS | HEIGHT: 64 IN | TEMPERATURE: 98 F | OXYGEN SATURATION: 99 % | SYSTOLIC BLOOD PRESSURE: 130 MMHG | BODY MASS INDEX: 29.02 KG/M2 | HEART RATE: 60 BPM | DIASTOLIC BLOOD PRESSURE: 78 MMHG

## 2021-06-07 PROBLEM — Z34.90 PREGNANCY (HCC): Status: RESOLVED | Noted: 2021-05-27 | Resolved: 2021-06-07

## 2021-06-07 PROBLEM — Z34.90 PREGNANCY: Status: RESOLVED | Noted: 2021-05-27 | Resolved: 2021-06-07

## 2021-06-07 RX ORDER — HYDROCODONE BITARTRATE AND ACETAMINOPHEN 5; 325 MG/1; MG/1
1 TABLET ORAL EVERY 4 HOURS PRN
Qty: 20 TABLET | Refills: 0 | Status: SHIPPED | OUTPATIENT
Start: 2021-06-07 | End: 2021-10-11

## 2021-06-07 NOTE — DISCHARGE SUMMARY
[facility]  Discharge Summary    Aida Gay Patient Status:  Inpatient    10/8/1985 MRN [id number]   [facility] 1SW-J Attending Coty Quevedo MD   [address] Day # 5 [address],      Admit Date:  2021    EDC: Es

## 2021-06-07 NOTE — PROGRESS NOTES
[facility]  Post-Partum Caesarean Section Progress Note    Nicole Tenorio Deidra Patient Status:  Inpatient    10/8/1985 MRN [id number]   [facility] 1SW-J Attending Hillary Love MD   Hosp Day # 5 PCP [address] [place] [address]

## 2021-06-11 ENCOUNTER — TELEPHONE (OUTPATIENT)
Dept: OBGYN UNIT | Facility: HOSPITAL | Age: 36
End: 2021-06-11

## 2021-06-11 NOTE — PROGRESS NOTES
Reviewed self and infant care w / mom, she verbalizes understanding of instructions reviewed. Encourage to follow up w/ MDs as directed and w/ questions/concerns.  Denies PIH, all sx reviewed, enc to join online groups

## 2021-06-18 PROBLEM — O09.819 PREGNANCY RESULTING FROM ASSISTED REPRODUCTIVE TECHNOLOGY (HCC): Status: RESOLVED | Noted: 2020-12-10 | Resolved: 2021-06-18

## 2021-06-18 PROBLEM — Z98.891 HX OF CESAREAN SECTION: Status: ACTIVE | Noted: 2021-06-18

## 2021-06-18 PROBLEM — Z87.441: Status: ACTIVE | Noted: 2021-06-18

## 2021-06-18 PROBLEM — O09.819 PREGNANCY RESULTING FROM ASSISTED REPRODUCTIVE TECHNOLOGY: Status: RESOLVED | Noted: 2020-12-10 | Resolved: 2021-06-18

## 2022-02-09 PROCEDURE — 87491 CHLMYD TRACH DNA AMP PROBE: CPT | Performed by: OBSTETRICS & GYNECOLOGY

## 2022-02-09 PROCEDURE — 88175 CYTOPATH C/V AUTO FLUID REDO: CPT | Performed by: OBSTETRICS & GYNECOLOGY

## 2022-02-09 PROCEDURE — 87624 HPV HI-RISK TYP POOLED RSLT: CPT | Performed by: OBSTETRICS & GYNECOLOGY

## 2022-02-09 PROCEDURE — 87591 N.GONORRHOEAE DNA AMP PROB: CPT | Performed by: OBSTETRICS & GYNECOLOGY

## 2023-01-24 PROCEDURE — 87086 URINE CULTURE/COLONY COUNT: CPT | Performed by: OBSTETRICS & GYNECOLOGY

## 2023-01-24 PROCEDURE — 87591 N.GONORRHOEAE DNA AMP PROB: CPT | Performed by: OBSTETRICS & GYNECOLOGY

## 2023-01-24 PROCEDURE — 87491 CHLMYD TRACH DNA AMP PROBE: CPT | Performed by: OBSTETRICS & GYNECOLOGY

## 2023-01-31 ENCOUNTER — LAB ENCOUNTER (OUTPATIENT)
Dept: LAB | Age: 38
End: 2023-01-31
Attending: OBSTETRICS & GYNECOLOGY
Payer: COMMERCIAL

## 2023-01-31 DIAGNOSIS — O09.521 AMA (ADVANCED MATERNAL AGE) MULTIGRAVIDA 35+, FIRST TRIMESTER: ICD-10-CM

## 2023-01-31 LAB
BASOPHILS # BLD AUTO: 0.05 X10(3) UL (ref 0–0.2)
BASOPHILS NFR BLD AUTO: 0.5 %
EOSINOPHIL # BLD AUTO: 0.09 X10(3) UL (ref 0–0.7)
EOSINOPHIL NFR BLD AUTO: 1 %
ERYTHROCYTE [DISTWIDTH] IN BLOOD BY AUTOMATED COUNT: 13 %
HBV SURFACE AG SER-ACNC: <0.1 [IU]/L
HBV SURFACE AG SERPL QL IA: NONREACTIVE
HCT VFR BLD AUTO: 43.2 %
HCV AB SERPL QL IA: NONREACTIVE
HGB BLD-MCNC: 14.3 G/DL
IMM GRANULOCYTES # BLD AUTO: 0.02 X10(3) UL (ref 0–1)
IMM GRANULOCYTES NFR BLD: 0.2 %
LYMPHOCYTES # BLD AUTO: 2.19 X10(3) UL (ref 1–4)
LYMPHOCYTES NFR BLD AUTO: 23.1 %
MCH RBC QN AUTO: 30.8 PG (ref 26–34)
MCHC RBC AUTO-ENTMCNC: 33.1 G/DL (ref 31–37)
MCV RBC AUTO: 92.9 FL
MONOCYTES # BLD AUTO: 0.5 X10(3) UL (ref 0.1–1)
MONOCYTES NFR BLD AUTO: 5.3 %
NEUTROPHILS # BLD AUTO: 6.62 X10 (3) UL (ref 1.5–7.7)
NEUTROPHILS # BLD AUTO: 6.62 X10(3) UL (ref 1.5–7.7)
NEUTROPHILS NFR BLD AUTO: 69.9 %
PLATELET # BLD AUTO: 323 10(3)UL (ref 150–450)
RBC # BLD AUTO: 4.65 X10(6)UL
RUBV IGG SER QL: POSITIVE
RUBV IGG SER-ACNC: 12.3 IU/ML (ref 10–?)
T PALLIDUM AB SER QL IA: NONREACTIVE
WBC # BLD AUTO: 9.5 X10(3) UL (ref 4–11)

## 2023-01-31 PROCEDURE — 87389 HIV-1 AG W/HIV-1&-2 AB AG IA: CPT

## 2023-01-31 PROCEDURE — 84156 ASSAY OF PROTEIN URINE: CPT | Performed by: OBSTETRICS & GYNECOLOGY

## 2023-01-31 PROCEDURE — 86780 TREPONEMA PALLIDUM: CPT

## 2023-01-31 PROCEDURE — 85025 COMPLETE CBC W/AUTO DIFF WBC: CPT

## 2023-01-31 PROCEDURE — 82570 ASSAY OF URINE CREATININE: CPT | Performed by: OBSTETRICS & GYNECOLOGY

## 2023-01-31 PROCEDURE — 36415 COLL VENOUS BLD VENIPUNCTURE: CPT

## 2023-01-31 PROCEDURE — 86803 HEPATITIS C AB TEST: CPT

## 2023-01-31 PROCEDURE — 86762 RUBELLA ANTIBODY: CPT

## 2023-01-31 PROCEDURE — 87340 HEPATITIS B SURFACE AG IA: CPT

## 2023-04-10 ENCOUNTER — OFFICE VISIT (OUTPATIENT)
Dept: PERINATAL CARE | Facility: HOSPITAL | Age: 38
End: 2023-04-10
Attending: OBSTETRICS & GYNECOLOGY
Payer: COMMERCIAL

## 2023-04-10 VITALS
BODY MASS INDEX: 24.79 KG/M2 | WEIGHT: 147 LBS | HEIGHT: 64.5 IN | HEART RATE: 82 BPM | DIASTOLIC BLOOD PRESSURE: 70 MMHG | SYSTOLIC BLOOD PRESSURE: 111 MMHG

## 2023-04-10 DIAGNOSIS — O09.522 MULTIGRAVIDA OF ADVANCED MATERNAL AGE IN SECOND TRIMESTER: ICD-10-CM

## 2023-04-10 DIAGNOSIS — O09.529 AMA (ADVANCED MATERNAL AGE) MULTIGRAVIDA 35+: ICD-10-CM

## 2023-04-10 DIAGNOSIS — O09.819 ENCOUNTER FOR SUPERVISION OF PREGNANCY RESULTING FROM ASSISTED REPRODUCTIVE TECHNOLOGY: Primary | ICD-10-CM

## 2023-04-10 DIAGNOSIS — O09.819 ENCOUNTER FOR SUPERVISION OF PREGNANCY RESULTING FROM ASSISTED REPRODUCTIVE TECHNOLOGY: ICD-10-CM

## 2023-04-10 PROCEDURE — 76811 OB US DETAILED SNGL FETUS: CPT | Performed by: OBSTETRICS & GYNECOLOGY

## 2023-05-01 ENCOUNTER — OFFICE VISIT (OUTPATIENT)
Dept: PERINATAL CARE | Facility: HOSPITAL | Age: 38
End: 2023-05-01
Attending: OBSTETRICS & GYNECOLOGY
Payer: COMMERCIAL

## 2023-05-01 VITALS
SYSTOLIC BLOOD PRESSURE: 109 MMHG | WEIGHT: 151 LBS | DIASTOLIC BLOOD PRESSURE: 68 MMHG | HEIGHT: 64.5 IN | BODY MASS INDEX: 25.46 KG/M2 | HEART RATE: 69 BPM

## 2023-05-01 DIAGNOSIS — Z87.441 H/O NEPHROTIC SYNDROME: ICD-10-CM

## 2023-05-01 DIAGNOSIS — O09.819 ENCOUNTER FOR SUPERVISION OF PREGNANCY RESULTING FROM ASSISTED REPRODUCTIVE TECHNOLOGY: Primary | ICD-10-CM

## 2023-05-01 DIAGNOSIS — O09.819 ENCOUNTER FOR SUPERVISION OF PREGNANCY RESULTING FROM ASSISTED REPRODUCTIVE TECHNOLOGY: ICD-10-CM

## 2023-05-01 DIAGNOSIS — Z98.891 HX OF CESAREAN SECTION: ICD-10-CM

## 2023-05-01 DIAGNOSIS — O09.522 MULTIGRAVIDA OF ADVANCED MATERNAL AGE IN SECOND TRIMESTER: ICD-10-CM

## 2023-05-01 DIAGNOSIS — O09.812 PREGNANCY RESULTING FROM IN VITRO FERTILIZATION IN SECOND TRIMESTER: ICD-10-CM

## 2023-05-01 PROCEDURE — 76825 ECHO EXAM OF FETAL HEART: CPT | Performed by: OBSTETRICS & GYNECOLOGY

## 2023-05-01 PROCEDURE — 76827 ECHO EXAM OF FETAL HEART: CPT

## 2023-05-01 PROCEDURE — 93325 DOPPLER ECHO COLOR FLOW MAPG: CPT

## 2023-05-16 ENCOUNTER — LAB ENCOUNTER (OUTPATIENT)
Dept: LAB | Facility: HOSPITAL | Age: 38
End: 2023-05-16
Attending: OBSTETRICS & GYNECOLOGY
Payer: COMMERCIAL

## 2023-05-16 DIAGNOSIS — O09.522 ENCOUNTER FOR SUPERVISION OF MULTIGRAVIDA OF ADVANCED MATERNAL AGE IN SECOND TRIMESTER: ICD-10-CM

## 2023-05-16 DIAGNOSIS — O09.521 AMA (ADVANCED MATERNAL AGE) MULTIGRAVIDA 35+, FIRST TRIMESTER: ICD-10-CM

## 2023-05-16 LAB
ANTIBODY SCREEN: NEGATIVE
BASOPHILS # BLD AUTO: 0.05 X10(3) UL (ref 0–0.2)
BASOPHILS NFR BLD AUTO: 0.5 %
EOSINOPHIL # BLD AUTO: 0.13 X10(3) UL (ref 0–0.7)
EOSINOPHIL NFR BLD AUTO: 1.2 %
ERYTHROCYTE [DISTWIDTH] IN BLOOD BY AUTOMATED COUNT: 12.7 %
GLUCOSE 1H P GLC SERPL-MCNC: 133 MG/DL
HCT VFR BLD AUTO: 37 %
HGB BLD-MCNC: 12 G/DL
IMM GRANULOCYTES # BLD AUTO: 0.05 X10(3) UL (ref 0–1)
IMM GRANULOCYTES NFR BLD: 0.5 %
LYMPHOCYTES # BLD AUTO: 1.73 X10(3) UL (ref 1–4)
LYMPHOCYTES NFR BLD AUTO: 16.4 %
MCH RBC QN AUTO: 29.2 PG (ref 26–34)
MCHC RBC AUTO-ENTMCNC: 32.4 G/DL (ref 31–37)
MCV RBC AUTO: 90 FL
MONOCYTES # BLD AUTO: 0.66 X10(3) UL (ref 0.1–1)
MONOCYTES NFR BLD AUTO: 6.3 %
NEUTROPHILS # BLD AUTO: 7.92 X10 (3) UL (ref 1.5–7.7)
NEUTROPHILS # BLD AUTO: 7.92 X10(3) UL (ref 1.5–7.7)
NEUTROPHILS NFR BLD AUTO: 75.1 %
PLATELET # BLD AUTO: 266 10(3)UL (ref 150–450)
RBC # BLD AUTO: 4.11 X10(6)UL
RH BLOOD TYPE: POSITIVE
WBC # BLD AUTO: 10.5 X10(3) UL (ref 4–11)

## 2023-05-16 PROCEDURE — 85025 COMPLETE CBC W/AUTO DIFF WBC: CPT

## 2023-05-16 PROCEDURE — 36415 COLL VENOUS BLD VENIPUNCTURE: CPT

## 2023-05-16 PROCEDURE — 86850 RBC ANTIBODY SCREEN: CPT

## 2023-05-16 PROCEDURE — 86900 BLOOD TYPING SEROLOGIC ABO: CPT

## 2023-05-16 PROCEDURE — 86901 BLOOD TYPING SEROLOGIC RH(D): CPT

## 2023-05-16 PROCEDURE — 82950 GLUCOSE TEST: CPT

## 2023-06-08 ENCOUNTER — LABORATORY ENCOUNTER (OUTPATIENT)
Dept: LAB | Facility: HOSPITAL | Age: 38
End: 2023-06-08
Attending: OBSTETRICS & GYNECOLOGY
Payer: COMMERCIAL

## 2023-06-08 DIAGNOSIS — O99.810 GLUCOSE INTOLERANCE OF PREGNANCY: ICD-10-CM

## 2023-06-08 LAB
GLUCOSE 1H P GLC SERPL-MCNC: 190 MG/DL
GLUCOSE 2H P GLC SERPL-MCNC: 142 MG/DL
GLUCOSE 3H P GLC SERPL-MCNC: 124 MG/DL (ref 70–140)
GLUCOSE P FAST SERPL-MCNC: 100 MG/DL

## 2023-06-08 PROCEDURE — 82951 GLUCOSE TOLERANCE TEST (GTT): CPT

## 2023-06-08 PROCEDURE — 36415 COLL VENOUS BLD VENIPUNCTURE: CPT

## 2023-06-08 PROCEDURE — 82952 GTT-ADDED SAMPLES: CPT

## 2023-06-09 ENCOUNTER — TELEPHONE (OUTPATIENT)
Dept: ENDOCRINOLOGY CLINIC | Facility: CLINIC | Age: 38
End: 2023-06-09

## 2023-06-19 ENCOUNTER — LAB ENCOUNTER (OUTPATIENT)
Dept: LAB | Facility: HOSPITAL | Age: 38
End: 2023-06-19
Attending: OBSTETRICS & GYNECOLOGY
Payer: COMMERCIAL

## 2023-06-19 DIAGNOSIS — O09.523 AMA (ADVANCED MATERNAL AGE) MULTIGRAVIDA 35+, THIRD TRIMESTER: ICD-10-CM

## 2023-06-19 PROCEDURE — 36415 COLL VENOUS BLD VENIPUNCTURE: CPT

## 2023-06-19 PROCEDURE — 87389 HIV-1 AG W/HIV-1&-2 AB AG IA: CPT

## 2023-07-13 ENCOUNTER — HOSPITAL ENCOUNTER (OUTPATIENT)
Facility: HOSPITAL | Age: 38
Discharge: HOME | End: 2023-07-13
Attending: OBSTETRICS & GYNECOLOGY | Admitting: OBSTETRICS & GYNECOLOGY
Payer: COMMERCIAL

## 2023-07-13 VITALS
DIASTOLIC BLOOD PRESSURE: 66 MMHG | TEMPERATURE: 98 F | SYSTOLIC BLOOD PRESSURE: 105 MMHG | BODY MASS INDEX: 26.49 KG/M2 | WEIGHT: 159 LBS | HEIGHT: 65 IN | HEART RATE: 72 BPM

## 2023-07-13 PROCEDURE — 99214 OFFICE O/P EST MOD 30 MIN: CPT

## 2023-07-13 PROCEDURE — 59025 FETAL NON-STRESS TEST: CPT

## 2023-07-13 NOTE — PROGRESS NOTES
07/13/23 1135   Nonstress Test   Multiple NST?  No   Variability 6-25 BPM   Decelerations None   Accelerations Yes   Acoustic Stimulator No   Baseline 135 BPM   Uterine Irritability Yes   Contractions Irregular   Interpretation   Nonstress Test Interpretation Reactive

## 2023-07-13 NOTE — PROGRESS NOTES
Pt is a  at 28 1/7 wk iup who is brought to room to r/o ROM. Pt reports noticing wetness and some dripping yesterday afternoon after her shower. Pt states nothing more has occurred since that time. Pt was seen in the office today and reported this episode to the NP. Pt was sent to L&D to r/o ROM. Pt reports +fm and denies any VB or UC's. EFM tested and applied. POC discussed and questions answered.

## 2023-07-13 NOTE — DISCHARGE INSTRUCTIONS
Discharge Instructions    Diet: Regular  Activity: Normal activity         General Instructions    Call your [facility] doctor if: Fluid leaking from your vagina;Uterine contractions 10 minutes or closer for 1 to 2 hours;Uterine contractions increasing in intensity and frequency; Decrease in fetal movement;Vaginal bleeding;Temperature greater than 100F;Vaginal or rectal pressure

## 2023-07-20 PROCEDURE — 87081 CULTURE SCREEN ONLY: CPT | Performed by: OBSTETRICS & GYNECOLOGY

## 2023-07-26 ENCOUNTER — TELEPHONE (OUTPATIENT)
Dept: OBGYN UNIT | Facility: HOSPITAL | Age: 38
End: 2023-07-26

## 2023-08-07 ENCOUNTER — ANESTHESIA (OUTPATIENT)
Dept: OBGYN UNIT | Facility: HOSPITAL | Age: 38
End: 2023-08-07
Payer: COMMERCIAL

## 2023-08-07 ENCOUNTER — HOSPITAL ENCOUNTER (INPATIENT)
Facility: HOSPITAL | Age: 38
LOS: 2 days | Discharge: HOME | End: 2023-08-09
Attending: OBSTETRICS & GYNECOLOGY | Admitting: OBSTETRICS & GYNECOLOGY
Payer: COMMERCIAL

## 2023-08-07 ENCOUNTER — ANESTHESIA EVENT (OUTPATIENT)
Dept: OBGYN UNIT | Facility: HOSPITAL | Age: 38
End: 2023-08-07
Payer: COMMERCIAL

## 2023-08-07 PROBLEM — Z34.90 PREGNANCY (HCC): Status: ACTIVE | Noted: 2023-08-07

## 2023-08-07 PROBLEM — Z34.90 PREGNANCY: Status: ACTIVE | Noted: 2023-08-07

## 2023-08-07 LAB
ANTIBODY SCREEN: NEGATIVE
BASOPHILS # BLD AUTO: 0.06 X10(3) UL (ref 0–0.2)
BASOPHILS NFR BLD AUTO: 0.5 %
EOSINOPHIL # BLD AUTO: 0.16 X10(3) UL (ref 0–0.7)
EOSINOPHIL NFR BLD AUTO: 1.3 %
ERYTHROCYTE [DISTWIDTH] IN BLOOD BY AUTOMATED COUNT: 17.6 %
HCT VFR BLD AUTO: 42.6 %
HGB BLD-MCNC: 14 G/DL
IMM GRANULOCYTES # BLD AUTO: 0.09 X10(3) UL (ref 0–1)
IMM GRANULOCYTES NFR BLD: 0.7 %
LYMPHOCYTES # BLD AUTO: 2.41 X10(3) UL (ref 1–4)
LYMPHOCYTES NFR BLD AUTO: 19.6 %
MCH RBC QN AUTO: 28.4 PG (ref 26–34)
MCHC RBC AUTO-ENTMCNC: 32.9 G/DL (ref 31–37)
MCV RBC AUTO: 86.4 FL
MONOCYTES # BLD AUTO: 1.12 X10(3) UL (ref 0.1–1)
MONOCYTES NFR BLD AUTO: 9.1 %
NEUTROPHILS # BLD AUTO: 8.43 X10 (3) UL (ref 1.5–7.7)
NEUTROPHILS # BLD AUTO: 8.43 X10(3) UL (ref 1.5–7.7)
NEUTROPHILS NFR BLD AUTO: 68.8 %
PLATELET # BLD AUTO: 221 10(3)UL (ref 150–450)
RBC # BLD AUTO: 4.93 X10(6)UL
RH BLOOD TYPE: POSITIVE
T PALLIDUM AB SER QL IA: NONREACTIVE
WBC # BLD AUTO: 12.3 X10(3) UL (ref 4–11)

## 2023-08-07 PROCEDURE — 58611 LIGATE OVIDUCT(S) ADD-ON: CPT | Performed by: OBSTETRICS & GYNECOLOGY

## 2023-08-07 PROCEDURE — 59514 CESAREAN DELIVERY ONLY: CPT | Performed by: OBSTETRICS & GYNECOLOGY

## 2023-08-07 DEVICE — INTERCEED: Type: IMPLANTABLE DEVICE | Site: ABDOMEN | Status: FUNCTIONAL

## 2023-08-07 RX ORDER — PHENYLEPHRINE HCL 10 MG/ML
VIAL (ML) INJECTION AS NEEDED
Status: DISCONTINUED | OUTPATIENT
Start: 2023-08-07 | End: 2023-08-07 | Stop reason: SURG

## 2023-08-07 RX ORDER — IBUPROFEN 600 MG/1
600 TABLET ORAL EVERY 6 HOURS
Status: DISCONTINUED | OUTPATIENT
Start: 2023-08-08 | End: 2023-08-09

## 2023-08-07 RX ORDER — PROCHLORPERAZINE EDISYLATE 5 MG/ML
10 INJECTION INTRAMUSCULAR; INTRAVENOUS EVERY 6 HOURS PRN
Status: DISCONTINUED | OUTPATIENT
Start: 2023-08-07 | End: 2023-08-09

## 2023-08-07 RX ORDER — ONDANSETRON 2 MG/ML
4 INJECTION INTRAMUSCULAR; INTRAVENOUS EVERY 6 HOURS PRN
Status: DISCONTINUED | OUTPATIENT
Start: 2023-08-07 | End: 2023-08-07

## 2023-08-07 RX ORDER — KETOROLAC TROMETHAMINE 30 MG/ML
30 INJECTION, SOLUTION INTRAMUSCULAR; INTRAVENOUS ONCE AS NEEDED
Status: COMPLETED | OUTPATIENT
Start: 2023-08-07 | End: 2023-08-07

## 2023-08-07 RX ORDER — DEXTROSE, SODIUM CHLORIDE, SODIUM LACTATE, POTASSIUM CHLORIDE, AND CALCIUM CHLORIDE 5; .6; .31; .03; .02 G/100ML; G/100ML; G/100ML; G/100ML; G/100ML
INJECTION, SOLUTION INTRAVENOUS CONTINUOUS PRN
Status: DISCONTINUED | OUTPATIENT
Start: 2023-08-07 | End: 2023-08-09

## 2023-08-07 RX ORDER — ACETAMINOPHEN 500 MG
1000 TABLET ORAL EVERY 6 HOURS
Status: DISCONTINUED | OUTPATIENT
Start: 2023-08-07 | End: 2023-08-09

## 2023-08-07 RX ORDER — CEFAZOLIN SODIUM/WATER 2 G/20 ML
2 SYRINGE (ML) INTRAVENOUS ONCE
Status: COMPLETED | OUTPATIENT
Start: 2023-08-07 | End: 2023-08-07

## 2023-08-07 RX ORDER — ONDANSETRON 2 MG/ML
INJECTION INTRAMUSCULAR; INTRAVENOUS
Status: COMPLETED
Start: 2023-08-07 | End: 2023-08-07

## 2023-08-07 RX ORDER — TRISODIUM CITRATE DIHYDRATE AND CITRIC ACID MONOHYDRATE 500; 334 MG/5ML; MG/5ML
SOLUTION ORAL
Status: COMPLETED
Start: 2023-08-07 | End: 2023-08-07

## 2023-08-07 RX ORDER — SODIUM CHLORIDE, SODIUM LACTATE, POTASSIUM CHLORIDE, CALCIUM CHLORIDE 600; 310; 30; 20 MG/100ML; MG/100ML; MG/100ML; MG/100ML
INJECTION, SOLUTION INTRAVENOUS CONTINUOUS
Status: DISCONTINUED | OUTPATIENT
Start: 2023-08-07 | End: 2023-08-09

## 2023-08-07 RX ORDER — CEFAZOLIN SODIUM/WATER 2 G/20 ML
SYRINGE (ML) INTRAVENOUS
Status: DISPENSED
Start: 2023-08-07 | End: 2023-08-07

## 2023-08-07 RX ORDER — ONDANSETRON 2 MG/ML
4 INJECTION INTRAMUSCULAR; INTRAVENOUS ONCE AS NEEDED
Status: COMPLETED | OUTPATIENT
Start: 2023-08-07 | End: 2023-08-07

## 2023-08-07 RX ORDER — MEPERIDINE HYDROCHLORIDE 25 MG/ML
12.5 INJECTION INTRAMUSCULAR; INTRAVENOUS; SUBCUTANEOUS ONCE AS NEEDED
Status: ACTIVE | OUTPATIENT
Start: 2023-08-07 | End: 2023-08-07

## 2023-08-07 RX ORDER — NALBUPHINE HYDROCHLORIDE 10 MG/ML
2.5 INJECTION, SOLUTION INTRAMUSCULAR; INTRAVENOUS; SUBCUTANEOUS
Status: DISCONTINUED | OUTPATIENT
Start: 2023-08-07 | End: 2023-08-09

## 2023-08-07 RX ORDER — DIPHENHYDRAMINE HYDROCHLORIDE 50 MG/ML
25 INJECTION INTRAMUSCULAR; INTRAVENOUS ONCE AS NEEDED
Status: ACTIVE | OUTPATIENT
Start: 2023-08-07 | End: 2023-08-07

## 2023-08-07 RX ORDER — ACETAMINOPHEN 500 MG
TABLET ORAL
Status: COMPLETED
Start: 2023-08-07 | End: 2023-08-07

## 2023-08-07 RX ORDER — NALOXONE HYDROCHLORIDE 0.4 MG/ML
0.08 INJECTION, SOLUTION INTRAMUSCULAR; INTRAVENOUS; SUBCUTANEOUS
Status: DISCONTINUED | OUTPATIENT
Start: 2023-08-07 | End: 2023-08-07

## 2023-08-07 RX ORDER — HYDROMORPHONE HYDROCHLORIDE 1 MG/ML
0.4 INJECTION, SOLUTION INTRAMUSCULAR; INTRAVENOUS; SUBCUTANEOUS EVERY 2 HOUR PRN
Status: DISCONTINUED | OUTPATIENT
Start: 2023-08-07 | End: 2023-08-07

## 2023-08-07 RX ORDER — MORPHINE SULFATE 2 MG/ML
INJECTION, SOLUTION INTRAMUSCULAR; INTRAVENOUS AS NEEDED
Status: DISCONTINUED | OUTPATIENT
Start: 2023-08-07 | End: 2023-08-07 | Stop reason: SURG

## 2023-08-07 RX ORDER — KETOROLAC TROMETHAMINE 30 MG/ML
30 INJECTION, SOLUTION INTRAMUSCULAR; INTRAVENOUS EVERY 6 HOURS
Status: COMPLETED | OUTPATIENT
Start: 2023-08-07 | End: 2023-08-08

## 2023-08-07 RX ORDER — ACETAMINOPHEN 500 MG
1000 TABLET ORAL ONCE
Status: COMPLETED | OUTPATIENT
Start: 2023-08-07 | End: 2023-08-07

## 2023-08-07 RX ORDER — KETOROLAC TROMETHAMINE 30 MG/ML
INJECTION, SOLUTION INTRAMUSCULAR; INTRAVENOUS
Status: COMPLETED
Start: 2023-08-07 | End: 2023-08-07

## 2023-08-07 RX ORDER — AMPICILLIN 2 G/1
INJECTION, POWDER, FOR SOLUTION INTRAVENOUS AS NEEDED
Status: DISCONTINUED | OUTPATIENT
Start: 2023-08-07 | End: 2023-08-07 | Stop reason: SURG

## 2023-08-07 RX ORDER — SODIUM CHLORIDE, SODIUM LACTATE, POTASSIUM CHLORIDE, CALCIUM CHLORIDE 600; 310; 30; 20 MG/100ML; MG/100ML; MG/100ML; MG/100ML
125 INJECTION, SOLUTION INTRAVENOUS CONTINUOUS
Status: DISCONTINUED | OUTPATIENT
Start: 2023-08-07 | End: 2023-08-07

## 2023-08-07 RX ORDER — ONDANSETRON 2 MG/ML
INJECTION INTRAMUSCULAR; INTRAVENOUS AS NEEDED
Status: DISCONTINUED | OUTPATIENT
Start: 2023-08-07 | End: 2023-08-07 | Stop reason: SURG

## 2023-08-07 RX ORDER — KETOROLAC TROMETHAMINE 30 MG/ML
30 INJECTION, SOLUTION INTRAMUSCULAR; INTRAVENOUS ONCE AS NEEDED
Status: DISCONTINUED | OUTPATIENT
Start: 2023-08-07 | End: 2023-08-07

## 2023-08-07 RX ORDER — LEVOTHYROXINE SODIUM 0.05 MG/1
50 TABLET ORAL DAILY
Status: DISCONTINUED | OUTPATIENT
Start: 2023-08-07 | End: 2023-08-09

## 2023-08-07 RX ORDER — DIPHENHYDRAMINE HYDROCHLORIDE 50 MG/ML
25 INJECTION INTRAMUSCULAR; INTRAVENOUS ONCE AS NEEDED
Status: DISCONTINUED | OUTPATIENT
Start: 2023-08-07 | End: 2023-08-07 | Stop reason: HOSPADM

## 2023-08-07 RX ORDER — NALBUPHINE HYDROCHLORIDE 10 MG/ML
2.5 INJECTION, SOLUTION INTRAMUSCULAR; INTRAVENOUS; SUBCUTANEOUS EVERY 4 HOURS PRN
Status: DISCONTINUED | OUTPATIENT
Start: 2023-08-07 | End: 2023-08-07

## 2023-08-07 RX ORDER — BUPIVACAINE HYDROCHLORIDE 7.5 MG/ML
INJECTION, SOLUTION INTRASPINAL AS NEEDED
Status: DISCONTINUED | OUTPATIENT
Start: 2023-08-07 | End: 2023-08-07 | Stop reason: SURG

## 2023-08-07 RX ORDER — MORPHINE SULFATE 0.5 MG/ML
0.2 INJECTION, SOLUTION EPIDURAL; INTRATHECAL; INTRAVENOUS ONCE
Status: DISCONTINUED | OUTPATIENT
Start: 2023-08-07 | End: 2023-08-07

## 2023-08-07 RX ORDER — DOCUSATE SODIUM 100 MG/1
100 CAPSULE, LIQUID FILLED ORAL
Status: DISCONTINUED | OUTPATIENT
Start: 2023-08-07 | End: 2023-08-09

## 2023-08-07 RX ORDER — SIMETHICONE 80 MG
80 TABLET,CHEWABLE ORAL DAILY PRN
Status: DISCONTINUED | OUTPATIENT
Start: 2023-08-07 | End: 2023-08-09

## 2023-08-07 RX ORDER — FAMOTIDINE 40 MG/1
40 TABLET, FILM COATED ORAL DAILY
COMMUNITY

## 2023-08-07 RX ORDER — NALBUPHINE HYDROCHLORIDE 10 MG/ML
2.5 INJECTION, SOLUTION INTRAMUSCULAR; INTRAVENOUS; SUBCUTANEOUS
Status: DISCONTINUED | OUTPATIENT
Start: 2023-08-07 | End: 2023-08-07

## 2023-08-07 RX ORDER — MISOPROSTOL 200 UG/1
1000 TABLET ORAL ONCE AS NEEDED
Status: ACTIVE | OUTPATIENT
Start: 2023-08-07 | End: 2023-08-07

## 2023-08-07 RX ORDER — TRISODIUM CITRATE DIHYDRATE AND CITRIC ACID MONOHYDRATE 500; 334 MG/5ML; MG/5ML
30 SOLUTION ORAL ONCE
Status: COMPLETED | OUTPATIENT
Start: 2023-08-07 | End: 2023-08-07

## 2023-08-07 RX ORDER — MORPHINE SULFATE 4 MG/ML
2 INJECTION, SOLUTION INTRAMUSCULAR; INTRAVENOUS EVERY 5 MIN PRN
Status: DISCONTINUED | OUTPATIENT
Start: 2023-08-07 | End: 2023-08-07 | Stop reason: HOSPADM

## 2023-08-07 RX ORDER — HYDROMORPHONE HYDROCHLORIDE 1 MG/ML
0.4 INJECTION, SOLUTION INTRAMUSCULAR; INTRAVENOUS; SUBCUTANEOUS EVERY 5 MIN PRN
Status: ACTIVE | OUTPATIENT
Start: 2023-08-07 | End: 2023-08-08

## 2023-08-07 RX ORDER — BISACODYL 10 MG
10 SUPPOSITORY, RECTAL RECTAL ONCE AS NEEDED
Status: DISCONTINUED | OUTPATIENT
Start: 2023-08-07 | End: 2023-08-09

## 2023-08-07 RX ORDER — DIPHENHYDRAMINE HYDROCHLORIDE 50 MG/ML
12.5 INJECTION INTRAMUSCULAR; INTRAVENOUS EVERY 4 HOURS PRN
Status: DISCONTINUED | OUTPATIENT
Start: 2023-08-07 | End: 2023-08-07

## 2023-08-07 RX ORDER — 0.9 % SODIUM CHLORIDE 0.9 %
INTRAVENOUS SOLUTION INTRAVENOUS
Status: DISPENSED
Start: 2023-08-07 | End: 2023-08-07

## 2023-08-07 RX ORDER — DIPHENHYDRAMINE HCL 25 MG
25 CAPSULE ORAL EVERY 4 HOURS PRN
Status: DISCONTINUED | OUTPATIENT
Start: 2023-08-07 | End: 2023-08-07

## 2023-08-07 RX ORDER — GABAPENTIN 300 MG/1
300 CAPSULE ORAL EVERY 8 HOURS PRN
Status: DISCONTINUED | OUTPATIENT
Start: 2023-08-07 | End: 2023-08-09

## 2023-08-07 RX ORDER — AMPICILLIN 2 G/1
INJECTION, POWDER, FOR SOLUTION INTRAVENOUS
Status: DISPENSED
Start: 2023-08-07 | End: 2023-08-07

## 2023-08-07 RX ORDER — ASPIRIN 81 MG/1
81 TABLET ORAL DAILY
COMMUNITY
End: 2023-08-09

## 2023-08-07 RX ADMIN — CEFAZOLIN SODIUM/WATER 2 G: 2 G/20 ML SYRINGE (ML) INTRAVENOUS at 05:13:00

## 2023-08-07 RX ADMIN — MORPHINE SULFATE 0.2 MG: 2 INJECTION, SOLUTION INTRAMUSCULAR; INTRAVENOUS at 05:10:00

## 2023-08-07 RX ADMIN — ONDANSETRON 4 MG: 2 INJECTION INTRAMUSCULAR; INTRAVENOUS at 05:44:00

## 2023-08-07 RX ADMIN — PHENYLEPHRINE HCL 100 MCG: 10 MG/ML VIAL (ML) INJECTION at 05:45:00

## 2023-08-07 RX ADMIN — PHENYLEPHRINE HCL 100 MCG: 10 MG/ML VIAL (ML) INJECTION at 05:37:00

## 2023-08-07 RX ADMIN — PHENYLEPHRINE HCL 100 MCG: 10 MG/ML VIAL (ML) INJECTION at 05:08:00

## 2023-08-07 RX ADMIN — AMPICILLIN 2 G: 2 INJECTION, POWDER, FOR SOLUTION INTRAVENOUS at 05:17:00

## 2023-08-07 RX ADMIN — BUPIVACAINE HYDROCHLORIDE 1.8 ML: 7.5 INJECTION, SOLUTION INTRASPINAL at 05:10:00

## 2023-08-07 RX ADMIN — PHENYLEPHRINE HCL 100 MCG: 10 MG/ML VIAL (ML) INJECTION at 05:24:00

## 2023-08-07 RX ADMIN — SODIUM CHLORIDE, SODIUM LACTATE, POTASSIUM CHLORIDE, CALCIUM CHLORIDE: 600; 310; 30; 20 INJECTION, SOLUTION INTRAVENOUS at 05:01:00

## 2023-08-07 RX ADMIN — PHENYLEPHRINE HCL 100 MCG: 10 MG/ML VIAL (ML) INJECTION at 05:14:00

## 2023-08-07 RX ADMIN — SODIUM CHLORIDE, SODIUM LACTATE, POTASSIUM CHLORIDE, CALCIUM CHLORIDE: 600; 310; 30; 20 INJECTION, SOLUTION INTRAVENOUS at 06:04:00

## 2023-08-07 NOTE — PROGRESS NOTES
Pt is a 40year old female admitted to TRG2/TRG2-A. Patient presents with:  R/o Rom     Pt is  38w5d intra-uterine pregnancy. History obtained, consents signed. Oriented to room, staff, and plan of care. Pt c/o SROM at 0145 with clear fluid. States she had a gush of fluid at that time and has continued to leak small amounts. Also reports ctx q 4-5 minutes and spotting. No obvious LOF upon arrival. Hx of previous c/s, desires repeat. EFM tested and applied, FHTs tracing and audible in 130s. Abdomen soft and non-tender.

## 2023-08-07 NOTE — PROGRESS NOTES
NURSING ADMISSION NOTE      Patient admitted via Cart  Oriented to room. Safety precautions initiated. Bed in low position. Call light in reach. Both mom and infant ID bands verified, hugs and kisses on. Teaching initiated, care plan discussed.

## 2023-08-07 NOTE — ANESTHESIA PROCEDURE NOTES
Spinal Block    Date/Time: 8/7/2023 5:01 AM    Performed by: Aicha Lemus MD  Authorized by: Aicha Lemus MD      General Information and Staff    Start Time:  8/7/2023 5:01 AM  End Time:  8/7/2023 5:08 AM  Anesthesiologist:  Aicha Lemus MD  Performed by:   Anesthesiologist  Patient Location:  OB  Site identification: surface landmarks    Preanesthetic Checklist: patient identified, IV checked, risks and benefits discussed, monitors and equipment checked, pre-op evaluation, timeout performed, anesthesia consent and sterile technique used      Procedure Details    Patient Position:  Sitting  Prep: ChloraPrep    Monitoring:  Cardiac monitor, heart rate and continuous pulse ox  Approach:  Midline  Location:  L3-4  Injection Technique:  Single-shot    Needle    Needle Type:  Sprotte  Needle Gauge:  24 G  Needle Length:  3.5 in    Assessment    Sensory Level:  T8  Events: clear CSF, CSF aspirated, well tolerated and blood negative      Additional Comments

## 2023-08-07 NOTE — PLAN OF CARE
Problem: BIRTH - VAGINAL/ SECTION  Goal: Fetal and maternal status remain reassuring during the birth process  Description: INTERVENTIONS:  - Monitor vital signs  - Monitor fetal heart rate  - Monitor uterine activity  - Monitor labor progression (vaginal delivery)  - DVT prophylaxis (C/S delivery)  - Surgical antibiotic prophylaxis (C/S delivery)  Outcome: Progressing     Problem: PAIN - ADULT  Goal: Verbalizes/displays adequate comfort level or patient's stated pain goal  Description: INTERVENTIONS:  - Encourage pt to monitor pain and request assistance  - Assess pain using appropriate pain scale  - Administer analgesics based on type and severity of pain and evaluate response  - Implement non-pharmacological measures as appropriate and evaluate response  - Consider cultural and social influences on pain and pain management  - Manage/alleviate anxiety  - Utilize distraction and/or relaxation techniques  - Monitor for opioid side effects  - Notify MD/LIP if interventions unsuccessful or patient reports new pain  - Anticipate increased pain with activity and pre-medicate as appropriate  Outcome: Progressing     Problem: ANXIETY  Goal: Will report anxiety at manageable levels  Description: INTERVENTIONS:  - Administer medication as ordered  - Teach and rehearse alternative coping skills  - Provide emotional support with 1:1 interaction with staff  Outcome: Progressing     Problem: Patient/Family Goals  Goal: Patient/Family Long Term Goal  Description: Patient's Long Term Goal: Safe,  delivery     Interventions:  -   - See additional Care Plan goals for specific interventions  Outcome: Progressing  Goal: Patient/Family Short Term Goal  Description: Patient's Short Term Goal: Effective pain management     Interventions:   - Spinal anesthesia   - See additional Care Plan goals for specific interventions  Outcome: Progressing

## 2023-08-07 NOTE — PLAN OF CARE
Problem: BIRTH - VAGINAL/ SECTION  Goal: Fetal and maternal status remain reassuring during the birth process  Description: INTERVENTIONS:  - Monitor vital signs  - Monitor fetal heart rate  - Monitor uterine activity  - Monitor labor progression (vaginal delivery)  - DVT prophylaxis (C/S delivery)  - Surgical antibiotic prophylaxis (C/S delivery)  Outcome: Completed     Problem: PAIN - ADULT  Goal: Verbalizes/displays adequate comfort level or patient's stated pain goal  Description: INTERVENTIONS:  - Encourage pt to monitor pain and request assistance  - Assess pain using appropriate pain scale  - Administer analgesics based on type and severity of pain and evaluate response  - Implement non-pharmacological measures as appropriate and evaluate response  - Consider cultural and social influences on pain and pain management  - Manage/alleviate anxiety  - Utilize distraction and/or relaxation techniques  - Monitor for opioid side effects  - Notify MD/LIP if interventions unsuccessful or patient reports new pain  - Anticipate increased pain with activity and pre-medicate as appropriate  Outcome: Progressing     Problem: ANXIETY  Goal: Will report anxiety at manageable levels  Description: INTERVENTIONS:  - Administer medication as ordered  - Teach and rehearse alternative coping skills  - Provide emotional support with 1:1 interaction with staff  Outcome: Progressing     Problem: Patient/Family Goals  Goal: Patient/Family Long Term Goal  Description: Patient's Long Term Goal: Safe,  delivery     Interventions:  -   - See additional Care Plan goals for specific interventions  Outcome: Completed  Goal: Patient/Family Short Term Goal  Description: Patient's Short Term Goal: Effective pain management     Interventions:   - Spinal anesthesia   - See additional Care Plan goals for specific interventions  Outcome: Completed     Problem: SAFETY ADULT - FALL  Goal: Free from fall injury  Description: INTERVENTIONS:  - Assess pt frequently for physical needs  - Identify cognitive and physical deficits and behaviors that affect risk of falls.   - [place] fall precautions as indicated by assessment.  - Educate pt/family on patient safety including physical limitations  - Instruct pt to call for assistance with activity based on assessment  - Modify environment to reduce risk of injury  - Provide assistive devices as appropriate  - Consider OT/PT consult to assist with strengthening/mobility  - Encourage toileting schedule  Outcome: Progressing

## 2023-08-08 LAB
BASOPHILS # BLD AUTO: 0.06 X10(3) UL (ref 0–0.2)
BASOPHILS NFR BLD AUTO: 0.4 %
EOSINOPHIL # BLD AUTO: 0.09 X10(3) UL (ref 0–0.7)
EOSINOPHIL NFR BLD AUTO: 0.6 %
ERYTHROCYTE [DISTWIDTH] IN BLOOD BY AUTOMATED COUNT: 18.2 %
HCT VFR BLD AUTO: 34.2 %
HGB BLD-MCNC: 11.1 G/DL
IMM GRANULOCYTES # BLD AUTO: 0.12 X10(3) UL (ref 0–1)
IMM GRANULOCYTES NFR BLD: 0.8 %
LYMPHOCYTES # BLD AUTO: 1.78 X10(3) UL (ref 1–4)
LYMPHOCYTES NFR BLD AUTO: 11.7 %
MCH RBC QN AUTO: 28.5 PG (ref 26–34)
MCHC RBC AUTO-ENTMCNC: 32.5 G/DL (ref 31–37)
MCV RBC AUTO: 87.7 FL
MONOCYTES # BLD AUTO: 0.93 X10(3) UL (ref 0.1–1)
MONOCYTES NFR BLD AUTO: 6.1 %
NEUTROPHILS # BLD AUTO: 12.19 X10 (3) UL (ref 1.5–7.7)
NEUTROPHILS # BLD AUTO: 12.19 X10(3) UL (ref 1.5–7.7)
NEUTROPHILS NFR BLD AUTO: 80.4 %
PLATELET # BLD AUTO: 174 10(3)UL (ref 150–450)
RBC # BLD AUTO: 3.9 X10(6)UL
WBC # BLD AUTO: 15.2 X10(3) UL (ref 4–11)

## 2023-08-08 NOTE — PLAN OF CARE
Problem: PAIN - ADULT  Goal: Verbalizes/displays adequate comfort level or patient's stated pain goal  Description: INTERVENTIONS:  - Encourage pt to monitor pain and request assistance  - Assess pain using appropriate pain scale  - Administer analgesics based on type and severity of pain and evaluate response  - Implement non-pharmacological measures as appropriate and evaluate response  - Consider cultural and social influences on pain and pain management  - Manage/alleviate anxiety  - Utilize distraction and/or relaxation techniques  - Monitor for opioid side effects  - Notify MD/LIP if interventions unsuccessful or patient reports new pain  - Anticipate increased pain with activity and pre-medicate as appropriate  Outcome: Progressing     Problem: ANXIETY  Goal: Will report anxiety at manageable levels  Description: INTERVENTIONS:  - Administer medication as ordered  - Teach and rehearse alternative coping skills  - Provide emotional support with 1:1 interaction with staff  Outcome: Progressing     Problem: SAFETY ADULT - FALL  Goal: Free from fall injury  Description: INTERVENTIONS:  - Assess pt frequently for physical needs  - Identify cognitive and physical deficits and behaviors that affect risk of falls. - [place] fall precautions as indicated by assessment.  - Educate pt/family on patient safety including physical limitations  - Instruct pt to call for assistance with activity based on assessment  - Modify environment to reduce risk of injury  - Provide assistive devices as appropriate  - Consider OT/PT consult to assist with strengthening/mobility  - Encourage toileting schedule  Outcome: Progressing     Problem: POSTPARTUM  Goal: Long Term Goal:Experiences normal postpartum course  Description: INTERVENTIONS:  - Assess and monitor vital signs and lab values. - Assess fundus and lochia. - Provide ice/sitz baths for perineum discomfort.   - Monitor healing of incision/episiotomy/laceration, and assess for signs and symptoms of infection and hematoma. - Assess bladder function and monitor for bladder distention.  - Provide/instruct/assist with pericare as needed. - Provide VTE prophylaxis as needed. - Monitor bowel function.  - Encourage ambulation and provide assistance as needed. - Assess and monitor emotional status and provide social service/psych resources as needed. - Utilize standard precautions and use personal protective equipment as indicated. Ensure aseptic care of all intravenous lines and invasive tubes/drains.  - Obtain immunization and exposure to communicable diseases history. Outcome: Progressing  Goal: Optimize infant feeding at the breast  Description: INTERVENTIONS:  - Initiate breast feeding within first hour after birth. - Monitor effectiveness of current breast feeding efforts. - Assess support systems available to mother/family.  - Identify cultural beliefs/practices regarding lactation, letdown techniques, maternal food preferences. - Assess mother's knowledge and previous experience with breast feeding.  - Provide information as needed about early infant feeding cues (e.g., rooting, lip smacking, sucking fingers/hand) versus late cue of crying.  - Discuss/demonstrate breast feeding aids (e.g., infant sling, nursing footstool/pillows, and breast pumps). - Encourage mother/other family members to express feelings/concerns, and actively listen. - Educate father/SO about benefits of breast feeding and how to manage common lactation challenges. - Recommend avoidance of specific medications or substances incompatible with breast feeding.  - Assess and monitor for signs of nipple pain/trauma. - Instruct and provide assistance with proper latch. - Review techniques for milk expression (breast pumping) and storage of breast milk. Provide pumping equipment/supplies, instructions and assistance, as needed.   - Encourage rooming-in and breast feeding on demand.  - Encourage skin-to-skin contact. - Provide LC support as needed. - Assess for and manage engorgement. - Provide breast feeding education handouts and information on community breast feeding support. Outcome: Progressing  Goal: Establishment of adequate milk supply with medication/procedure interruptions  Description: INTERVENTIONS:  - Review techniques for milk expression (breast pumping). - Provide pumping equipment/supplies, instructions, and assistance until it is safe to breastfeed infant. Outcome: Progressing  Goal: Appropriate maternal -  bonding  Description: INTERVENTIONS:  - Assess caregiver- interactions. - Assess caregiver's emotional status and coping mechanisms. - Encourage caregiver to participate in  daily care. - Assess support systems available to mother/family.  - Provide /case management support as needed.   Outcome: Progressing

## 2023-08-09 VITALS
WEIGHT: 164 LBS | DIASTOLIC BLOOD PRESSURE: 88 MMHG | SYSTOLIC BLOOD PRESSURE: 111 MMHG | RESPIRATION RATE: 16 BRPM | TEMPERATURE: 98 F | OXYGEN SATURATION: 98 % | HEART RATE: 55 BPM | HEIGHT: 65 IN | BODY MASS INDEX: 27.32 KG/M2

## 2023-08-09 PROBLEM — Z34.90 PREGNANCY (HCC): Status: RESOLVED | Noted: 2023-08-07 | Resolved: 2023-08-09

## 2023-08-09 PROBLEM — Z34.90 PREGNANCY: Status: RESOLVED | Noted: 2023-08-07 | Resolved: 2023-08-09

## 2023-08-09 NOTE — DISCHARGE SUMMARY
[facility]  Discharge Summary    Edmond Gay Patient Status:  Inpatient    10/8/1985 MRN [id number]   [facility] 2SW-J Attending Marlyn Kruse MD   Hosp Day # 2 PCP ELOISE GELLER [de-identified], DO     Admit Date:  2023    [facility]: Estimated Date of Delivery: 23    Gestational Age: 38w5d    Antepartum complications: See Prenatal Record    Date of Delivery: See Delivery Summary    Delivered By:  See Delivery Summary    Delivery Type: repeat  section, low transverse incision       Intrapartum Complications: See Delivery Summary    Episiotomy / lacerations: See Delivery Summary      Rh Immune Globulin Given:  See Prenatal Record and nursing notes    Rubella Vaccine Given: See Prenatal Record and nursing notes    Activity:  Routine post partum and post operative instructions if  section    Medications:  Motrin alternating with Tylenol2    Diet:  General    Discharge Date: 2023          Plan:       Follow-up appointment in 2  weeks  Routine post partum instructions    Marci Porter MD  2023  10:41 AM

## 2023-08-11 ENCOUNTER — TELEPHONE (OUTPATIENT)
Dept: OBGYN UNIT | Facility: HOSPITAL | Age: 38
End: 2023-08-11

## 2023-08-22 PROBLEM — Z98.891 HX OF CESAREAN SECTION: Status: RESOLVED | Noted: 2021-06-18 | Resolved: 2023-08-22

## 2023-12-08 ENCOUNTER — OFFICE VISIT (OUTPATIENT)
Dept: FAMILY MEDICINE CLINIC | Facility: CLINIC | Age: 38
End: 2023-12-08
Payer: COMMERCIAL

## 2023-12-08 VITALS
HEART RATE: 99 BPM | OXYGEN SATURATION: 97 % | WEIGHT: 141 LBS | HEIGHT: 65 IN | BODY MASS INDEX: 23.49 KG/M2 | SYSTOLIC BLOOD PRESSURE: 100 MMHG | DIASTOLIC BLOOD PRESSURE: 70 MMHG

## 2023-12-08 DIAGNOSIS — B35.9 RINGWORM: Primary | ICD-10-CM

## 2023-12-08 PROCEDURE — 3078F DIAST BP <80 MM HG: CPT | Performed by: STUDENT IN AN ORGANIZED HEALTH CARE EDUCATION/TRAINING PROGRAM

## 2023-12-08 PROCEDURE — 99203 OFFICE O/P NEW LOW 30 MIN: CPT | Performed by: STUDENT IN AN ORGANIZED HEALTH CARE EDUCATION/TRAINING PROGRAM

## 2023-12-08 PROCEDURE — 3008F BODY MASS INDEX DOCD: CPT | Performed by: STUDENT IN AN ORGANIZED HEALTH CARE EDUCATION/TRAINING PROGRAM

## 2023-12-08 PROCEDURE — 3074F SYST BP LT 130 MM HG: CPT | Performed by: STUDENT IN AN ORGANIZED HEALTH CARE EDUCATION/TRAINING PROGRAM

## 2023-12-08 RX ORDER — KETOCONAZOLE 20 MG/G
1 CREAM TOPICAL 2 TIMES DAILY
Qty: 30 G | Refills: 0 | Status: SHIPPED | OUTPATIENT
Start: 2023-12-08 | End: 2023-12-22

## 2023-12-25 ENCOUNTER — HOSPITAL ENCOUNTER (EMERGENCY)
Age: 38
Discharge: HOME | End: 2023-12-25
Attending: EMERGENCY MEDICINE
Payer: COMMERCIAL

## 2023-12-25 VITALS
TEMPERATURE: 98 F | WEIGHT: 132 LBS | RESPIRATION RATE: 16 BRPM | DIASTOLIC BLOOD PRESSURE: 81 MMHG | HEIGHT: 64 IN | BODY MASS INDEX: 22.53 KG/M2 | HEART RATE: 74 BPM | OXYGEN SATURATION: 98 % | SYSTOLIC BLOOD PRESSURE: 140 MMHG

## 2023-12-25 DIAGNOSIS — L02.91 ABSCESS: ICD-10-CM

## 2023-12-25 DIAGNOSIS — L03.211 FACIAL CELLULITIS: Primary | ICD-10-CM

## 2023-12-25 PROCEDURE — 10060 I&D ABSCESS SIMPLE/SINGLE: CPT

## 2023-12-25 PROCEDURE — 99284 EMERGENCY DEPT VISIT MOD MDM: CPT

## 2023-12-25 RX ORDER — SULFAMETHOXAZOLE AND TRIMETHOPRIM 800; 160 MG/1; MG/1
1 TABLET ORAL 2 TIMES DAILY
Qty: 14 TABLET | Refills: 0 | Status: SHIPPED | OUTPATIENT
Start: 2023-12-25 | End: 2024-01-01

## 2023-12-25 RX ORDER — SULFAMETHOXAZOLE AND TRIMETHOPRIM 800; 160 MG/1; MG/1
1 TABLET ORAL ONCE
Status: COMPLETED | OUTPATIENT
Start: 2023-12-25 | End: 2023-12-25

## 2023-12-27 ENCOUNTER — PATIENT OUTREACH (OUTPATIENT)
Dept: CASE MANAGEMENT | Age: 38
End: 2023-12-27

## 2024-01-23 PROCEDURE — 87491 CHLMYD TRACH DNA AMP PROBE: CPT | Performed by: OBSTETRICS & GYNECOLOGY

## 2024-01-23 PROCEDURE — 87591 N.GONORRHOEAE DNA AMP PROB: CPT | Performed by: OBSTETRICS & GYNECOLOGY

## 2024-02-13 ENCOUNTER — NURSE ONLY (OUTPATIENT)
Dept: LAB | Age: 39
End: 2024-02-13
Attending: OBSTETRICS & GYNECOLOGY
Payer: COMMERCIAL

## 2024-02-13 DIAGNOSIS — Z13.220 SCREENING FOR HYPERLIPIDEMIA: Primary | ICD-10-CM

## 2024-02-13 LAB
BASOPHILS # BLD AUTO: 0.05 X10(3) UL (ref 0–0.2)
BASOPHILS NFR BLD AUTO: 1 %
CHOLEST SERPL-MCNC: 184 MG/DL (ref ?–200)
EOSINOPHIL # BLD AUTO: 0.13 X10(3) UL (ref 0–0.7)
EOSINOPHIL NFR BLD AUTO: 2.7 %
ERYTHROCYTE [DISTWIDTH] IN BLOOD BY AUTOMATED COUNT: 12.4 %
FASTING PATIENT GLUCOSE ANSWER: YES
FASTING PATIENT LIPID ANSWER: YES
GLUCOSE BLD-MCNC: 96 MG/DL (ref 70–99)
HCT VFR BLD AUTO: 42.4 %
HDLC SERPL-MCNC: 73 MG/DL (ref 40–59)
HGB BLD-MCNC: 14.1 G/DL
IMM GRANULOCYTES # BLD AUTO: 0.01 X10(3) UL (ref 0–1)
IMM GRANULOCYTES NFR BLD: 0.2 %
LDLC SERPL CALC-MCNC: 101 MG/DL (ref ?–100)
LYMPHOCYTES # BLD AUTO: 1.92 X10(3) UL (ref 1–4)
LYMPHOCYTES NFR BLD AUTO: 39.3 %
MCH RBC QN AUTO: 30.1 PG (ref 26–34)
MCHC RBC AUTO-ENTMCNC: 33.3 G/DL (ref 31–37)
MCV RBC AUTO: 90.6 FL
MONOCYTES # BLD AUTO: 0.43 X10(3) UL (ref 0.1–1)
MONOCYTES NFR BLD AUTO: 8.8 %
NEUTROPHILS # BLD AUTO: 2.35 X10 (3) UL (ref 1.5–7.7)
NEUTROPHILS # BLD AUTO: 2.35 X10(3) UL (ref 1.5–7.7)
NEUTROPHILS NFR BLD AUTO: 48 %
NONHDLC SERPL-MCNC: 111 MG/DL (ref ?–130)
PLATELET # BLD AUTO: 274 10(3)UL (ref 150–450)
RBC # BLD AUTO: 4.68 X10(6)UL
TRIGL SERPL-MCNC: 50 MG/DL (ref 30–149)
VLDLC SERPL CALC-MCNC: 8 MG/DL (ref 0–30)
WBC # BLD AUTO: 4.9 X10(3) UL (ref 4–11)

## 2024-02-13 PROCEDURE — 36415 COLL VENOUS BLD VENIPUNCTURE: CPT

## 2024-02-13 PROCEDURE — 82947 ASSAY GLUCOSE BLOOD QUANT: CPT

## 2024-02-13 PROCEDURE — 80061 LIPID PANEL: CPT

## 2024-02-13 PROCEDURE — 85025 COMPLETE CBC W/AUTO DIFF WBC: CPT

## 2024-02-21 ENCOUNTER — PATIENT MESSAGE (OUTPATIENT)
Dept: FAMILY MEDICINE CLINIC | Facility: CLINIC | Age: 39
End: 2024-02-21

## 2024-02-21 NOTE — TELEPHONE ENCOUNTER
From: Sharri Gay  To: Diane Huffman  Sent: 2/21/2024 3:51 PM CST  Subject: Face abscess     Hi There    I am seeking some advice In hopes, you can steer me in the right direction. I seem to have formed another infected pimple/abscess on my face. I was treated for this at the hospital on [facility] Day. The abscess or infected spot on my face now is just about where the last spot was. It is also on the same side of my face that I had that ringworm I couldn’t get rid of. Though the ringworm has not been a problem and maybe they are not connected at all, my brain seems to think there has to be something going on. Last time I was treated with a antibiotic and it seemed to do the trick pretty quickly, but was hoping to see what your suggestion would be in regards to getting it, checked out and having a follow up. I would prefer to do this with you, so that it is Seen by the same person.     I will spare you the picture of the actual uncovered abscess, because it’s pretty gross. But if you would like me to take one and send it your way, please let me know.  Thanks so much for any suggestions or advice you might have.     Sharri

## 2024-02-21 NOTE — TELEPHONE ENCOUNTER
Called and LMOM to call back to schedule an appointment will ask if Dr Huffman can double book her on Friday

## 2024-02-23 NOTE — TELEPHONE ENCOUNTER
Facial cellulitis and abscess is not appropriate for a video visit. Please cancel her video visit and either schedule for Monday or triage if she needs to be seen today and send to  - thank you

## 2024-02-23 NOTE — TELEPHONE ENCOUNTER
If it needs immediate attention she'll unfortunately need to go to immediate care or urgent care -

## 2024-02-24 ENCOUNTER — OFFICE VISIT (OUTPATIENT)
Dept: FAMILY MEDICINE CLINIC | Facility: CLINIC | Age: 39
End: 2024-02-24
Payer: COMMERCIAL

## 2024-02-24 VITALS
TEMPERATURE: 97 F | DIASTOLIC BLOOD PRESSURE: 72 MMHG | SYSTOLIC BLOOD PRESSURE: 108 MMHG | WEIGHT: 136.19 LBS | BODY MASS INDEX: 23 KG/M2 | RESPIRATION RATE: 16 BRPM | HEART RATE: 84 BPM

## 2024-02-24 DIAGNOSIS — L02.01 FACIAL ABSCESS: Primary | ICD-10-CM

## 2024-02-24 PROCEDURE — 99213 OFFICE O/P EST LOW 20 MIN: CPT | Performed by: PHYSICIAN ASSISTANT

## 2024-02-24 PROCEDURE — 3078F DIAST BP <80 MM HG: CPT | Performed by: PHYSICIAN ASSISTANT

## 2024-02-24 PROCEDURE — 3074F SYST BP LT 130 MM HG: CPT | Performed by: PHYSICIAN ASSISTANT

## 2024-02-24 RX ORDER — CEPHALEXIN 500 MG/1
500 CAPSULE ORAL 3 TIMES DAILY
Qty: 21 CAPSULE | Refills: 0 | Status: SHIPPED | OUTPATIENT
Start: 2024-02-24 | End: 2024-03-02

## 2024-02-24 RX ORDER — CLINDAMYCIN PHOSPHATE 10 MG/G
1 GEL TOPICAL NIGHTLY PRN
Qty: 60 G | Refills: 0 | Status: SHIPPED | OUTPATIENT
Start: 2024-02-24

## 2024-02-27 NOTE — PROGRESS NOTES
Chief Complaint   Patient presents with    Abscess     On left cheek for about 5 days        HISTORY OF PRESENT ILLNESS  Sharri Gay is a 38 year old female who presents for evaluation of facial abscess. Started to notice a few weeks ago that there may be something but it seemed to grow larger about 5 days ago. Started to drain last night and this morning. Seeing more purulent drainage. Does not sound like much for cystic drainage. Was quite tender initially. Concerned as she had an abscess over Ben[place] that led to ER visit with significant facial edema and was near the same area. Treated with Bactrim, does not sound like there was a culture taken at that time. There was no firmness/ lump that she had noticed after this had resolved. No fevers or chills.       Current Outpatient Medications:     cephalexin 500 MG Oral Cap, Take 1 capsule (500 mg total) by mouth 3 (three) times daily for 7 days., Disp: 21 capsule, Rfl: 0    Clindamycin Phosphate 1 % External Gel, Apply 1 Application topically nightly as needed., Disp: 60 g, Rfl: 0    prenatal multivitamin plus DHA 27-0.8-228 MG Oral Cap, Take 1 capsule by mouth daily., Disp: , Rfl:     Allergies: Patient has no known allergies.    Patient Active Problem List   Diagnosis    Nasal congestion    Allergic rhinitis    Vocal nodules in adults    H/O adenoidectomy    Deviated septum    S/P ACL surgery    H/O nephrotic syndrome - pregnancy    Mother positive for group B Streptococcus colonization    s/p-  section  X 2 - with Tubal ligation  during second       Past Surgical History:   Procedure Laterality Date    Adenoidectomy      Anterior cruciate ligament repair Left       2021    Excision turbinate  2011    Performed by KATHY SHAVER at [facility]    Excision turbinate  2011    Performed by KATHY SHAVER at [facility], [facility]    Reconstr nose,complete+external  2011    Performed by CHHAYA  KATHY BUSTAMANTE at [facility], [facility]    Removal adenoids,primary,12+ y/o N/A 04/22/2016    Procedure: ADENOIDECTOMY;  Surgeon: Jamey Ramirez MD;  Location: [facility]    Remv cartilage for graft nasal  07/22/2011    Performed by KATHY SHAVER at [facility]    Repair nasal stenosis  07/22/2011    Performed by KATHY SHAVER at [facility], [facility]    Repair of nasal septum  07/22/2011    Performed by KATHY SHAVER at [facility], [facility]    Sinus surgery           Social History     Socioeconomic History    Marital status:    Tobacco Use    Smoking status: Never    Smokeless tobacco: Never   Vaping Use    Vaping Use: Never used   Substance and Sexual Activity    Alcohol use: Not Currently    Drug use: No    Sexual activity: Yes     Partners: Male     Social Determinants of Health     Financial Resource Strain: Low Risk  (8/7/2023)    Financial Resource Strain     Difficulty of Paying Living Expenses: Not hard at all     Med Affordability: No   Food Insecurity: No Food Insecurity (8/7/2023)    Food Insecurity     Food Insecurity: Never true   Transportation Needs: No Transportation Needs (8/7/2023)    Transportation Needs     Lack of Transportation: No   Stress: No Stress Concern Present (8/7/2023)    Stress     Feeling of Stress : No   Housing Stability: Low Risk  (8/7/2023)    Housing Stability     Housing Instability: No         Vitals:    02/24/24 1025   BP: 108/72   Pulse: 84   Resp: 16   Temp: 97 °F (36.1 °C)       PHYSICAL EXAM  GENERAL:  Alert and in no acute distress.  Well groomed and appropriately dressed.  SKIN: ~1 cm x 0.5 cm firm opened abscess on left cheek near jawline. Very scant purulent drainage with expression. No sebaceous matter noted. No surrounding erythema or warmth.    ASSESSMENT/ PLAN  1. Facial abscess  Significant improvement overnight now that opened and drained. Minimal drainage now. Does not appear to be cystic. Difficult to fully discern  if there could be underlying cyst- informed patient what to monitor for. At this time, does not appear to be infected. Will send watch and wait rx for keflex (pt breastfeeding). Keep on warm compresses.   In future, consider topical clinda gel if she starts to notice any areas of concerns as this could be helpful. Follow up for persistent or worsening sxs.    Patient expresses understanding and agreement with above plan.  Tod Dietz PA-C

## 2024-07-30 ENCOUNTER — HOSPITAL ENCOUNTER (OUTPATIENT)
Age: 39
Discharge: HOME | End: 2024-07-30
Payer: COMMERCIAL

## 2024-07-30 VITALS
HEIGHT: 64 IN | RESPIRATION RATE: 18 BRPM | TEMPERATURE: 98 F | BODY MASS INDEX: 22.2 KG/M2 | OXYGEN SATURATION: 97 % | DIASTOLIC BLOOD PRESSURE: 56 MMHG | HEART RATE: 62 BPM | SYSTOLIC BLOOD PRESSURE: 117 MMHG | WEIGHT: 130 LBS

## 2024-07-30 DIAGNOSIS — L02.415 ABSCESS OF RIGHT LEG: Primary | ICD-10-CM

## 2024-07-30 PROCEDURE — 99213 OFFICE O/P EST LOW 20 MIN: CPT

## 2024-07-30 PROCEDURE — 99214 OFFICE O/P EST MOD 30 MIN: CPT

## 2024-07-30 RX ORDER — CEPHALEXIN 500 MG/1
500 CAPSULE ORAL 4 TIMES DAILY
Qty: 40 CAPSULE | Refills: 0 | Status: SHIPPED | OUTPATIENT
Start: 2024-07-30 | End: 2024-08-09

## 2024-07-30 NOTE — ED PROVIDER NOTES
Patient Seen in: [facility]      History   No chief complaint on file.    Stated Complaint: Abscess    Subjective:   HPI    38-year-old female presents to the  for abscess to right leg.  Started a few days ago.  Does not think it is an insect bite.  Has had a staph infection in the past.  No fever.  No drainage    Objective:   Past Medical History:    Infertility, female    ivf              Past Surgical History:   Procedure Laterality Date    Adenoidectomy      Anterior cruciate ligament repair Left       2021    Excision turbinate  2011    Performed by KTAHY SHAVER at [facility]    Excision turbinate  2011    Performed by KATHY SHAVER at [facility], [facility]    Reconstr nose,complete+external  2011    Performed by KATHY SHAVER at [facility], [facility]    Removal adenoids,primary,12+ y/o N/A 2016    Procedure: ADENOIDECTOMY;  Surgeon: Jamey Ramirez MD;  Location: [facility], [facility]    Remv cartilage for graft nasal  2011    Performed by KATHY SHAVER at [facility], [facility]    Repair nasal stenosis  2011    Performed by KATHY SHAVER at [facility], [facility]    Repair of nasal septum  2011    Performed by KATHY SHAVER at [facility], [facility]    Sinus surgery                    Social History     Socioeconomic History    Marital status:    Tobacco Use    Smoking status: Never    Smokeless tobacco: Never   Vaping Use    Vaping status: Never Used   Substance and Sexual Activity    Alcohol use: Not Currently    Drug use: No    Sexual activity: Yes     Partners: Male     Social Determinants of Health     Financial Resource Strain: Low Risk  (2023)    Financial Resource Strain     Difficulty of Paying Living Expenses: Not hard at all     Med Affordability: No   Food Insecurity: No Food Insecurity (2023)    Food Insecurity     Food Insecurity: Never true   Transportation Needs: No  Transportation Needs (8/7/2023)    Transportation Needs     Lack of Transportation: No   Stress: No Stress Concern Present (8/7/2023)    Stress     Feeling of Stress : No   Housing Stability: Low Risk  (8/7/2023)    Housing Stability     Housing Instability: No              Review of Systems    Positive for stated Chief Complaint: No chief complaint on file.    Other systems are as noted in HPI.  Constitutional and vital signs reviewed.      All other systems reviewed and negative except as noted above.    Physical Exam     ED Triage Vitals [07/30/24 1039]   /56   Pulse 62   Resp 18   Temp 97.9 °F (36.6 °C)   Temp src Temporal   SpO2 97 %   O2 Device None (Room air)       Current Vitals:   Vital Signs  BP: 117/56  Pulse: 62  Resp: 18  Temp: 97.9 °F (36.6 °C)  Temp src: Temporal    Oxygen Therapy  SpO2: 97 %  O2 Device: None (Room air)            Physical Exam  Vitals and nursing note reviewed.   Constitutional:       Appearance: She is well-developed.   HENT:      Head: Atraumatic.      Right Ear: External ear normal.      Left Ear: External ear normal.   Eyes:      Conjunctiva/sclera: Conjunctivae normal.   Cardiovascular:      Rate and Rhythm: Normal rate.   Pulmonary:      Effort: Pulmonary effort is normal.   Musculoskeletal:      Cervical back: Normal range of motion and neck supple.   Skin:     General: Skin is warm and dry.      Capillary Refill: Capillary refill takes less than 2 seconds.      Comments: Area of erythema to right calf, approximately 2cm, small blister in the center   Neurological:      Mental Status: She is alert.   Psychiatric:         Mood and Affect: Mood normal.               ED Course   Labs Reviewed - No data to display                   MDM        38-year-old female presents with abscess to right leg.    Differential diagnosis includes but not limited to:  Infected insect bite, abscess, cellulitis    Area of erythema noted on exam.  No fluctuance.  Will start patient on Keflex.   No indication for I&D at this time.                             Medical Decision Making      Disposition and Plan     Clinical Impression:  1. Abscess of right leg         Disposition:  Discharge  7/30/2024 11:12 am    Follow-up:  Diane Huffman MD  [address] [place] [address] [place] [id number] [phone]                Medications Prescribed:  Discharge Medication List as of 7/30/2024 11:14 AM        START taking these medications    Details   cephalexin 500 MG Oral Cap Take 1 capsule (500 mg total) by mouth 4 (four) times daily for 10 days., Normal, Disp-40 capsule, R-0

## 2024-10-16 NOTE — PROGRESS NOTES
Patient discharged to home. Discharge teaching completed. All questions answered. 70 y.o M BIB self p/w c/o abd pain. A+Ox4. Pt states has hx of incarcerated hernia repair, and has been having generalized abd pain over the past x2 days a/w decreased BMs, distended abdomen and nausea. Upon initial assessment, abd soft to touch w/ worsening generalized abd pain on palpation. Ambulating at baseline, Denies CP, SOB, NGUYEN, urinary sx, lightheadedness, HA, vison changes, dizziness. Gross neuro intact. No other complaints at this time, safety maintained.

## 2024-12-30 ENCOUNTER — PATIENT MESSAGE (OUTPATIENT)
Dept: FAMILY MEDICINE CLINIC | Facility: CLINIC | Age: 39
End: 2024-12-30

## 2025-02-27 ENCOUNTER — OFFICE VISIT (OUTPATIENT)
Dept: FAMILY MEDICINE CLINIC | Facility: CLINIC | Age: 40
End: 2025-02-27
Payer: COMMERCIAL

## 2025-02-27 VITALS
SYSTOLIC BLOOD PRESSURE: 110 MMHG | TEMPERATURE: 98 F | RESPIRATION RATE: 14 BRPM | OXYGEN SATURATION: 99 % | DIASTOLIC BLOOD PRESSURE: 82 MMHG | WEIGHT: 135 LBS | HEART RATE: 60 BPM | BODY MASS INDEX: 23 KG/M2

## 2025-02-27 DIAGNOSIS — R45.87 IMPULSIVE: ICD-10-CM

## 2025-02-27 DIAGNOSIS — L98.9 SKIN LESION ON EXAMINATION: Primary | ICD-10-CM

## 2025-02-27 DIAGNOSIS — F41.9 ANXIETY: ICD-10-CM

## 2025-02-27 PROCEDURE — 99214 OFFICE O/P EST MOD 30 MIN: CPT | Performed by: PHYSICIAN ASSISTANT

## 2025-02-27 PROCEDURE — 3074F SYST BP LT 130 MM HG: CPT | Performed by: PHYSICIAN ASSISTANT

## 2025-02-27 PROCEDURE — 3079F DIAST BP 80-89 MM HG: CPT | Performed by: PHYSICIAN ASSISTANT

## 2025-02-27 RX ORDER — BUPROPION HYDROCHLORIDE 150 MG/1
150 TABLET ORAL DAILY
Qty: 90 TABLET | Refills: 0 | Status: SHIPPED | OUTPATIENT
Start: 2025-02-27

## 2025-02-27 RX ORDER — CLINDAMYCIN PHOSPHATE 10 MG/G
1 GEL TOPICAL NIGHTLY PRN
COMMUNITY
Start: 2024-10-30

## 2025-02-27 NOTE — PROGRESS NOTES
Chief Complaint   Patient presents with    Skin        HISTORY OF PRESENT ILLNESS  Sharri Gay is a 39 year old female who presents for few concerns.    - Multiple skin spots- few that changed during pregnancy. Large spot right shoulder that has decreased in size. Increasing size mole right mid abdomen. Left thigh.  - Impulsive/ hurtful in conversations with . Affecting relationship. Some anxiety. Constantly thinking about next projects. She typically will complete the projects. Wants to work on this to be the best mother/ spouse.       Current Outpatient Medications:     Clindamycin Phosphate 1 % External Gel, Apply 1 Application topically nightly as needed., Disp: , Rfl:     Allergies: Patient has no known allergies.    Patient Active Problem List   Diagnosis    Nasal congestion    Allergic rhinitis    Vocal nodules in adults    H/O adenoidectomy    Deviated septum    S/P ACL surgery    H/O nephrotic syndrome - pregnancy    Mother positive for group B Streptococcus colonization    s/p-  section  X 2 - with Tubal ligation  during second       Past Surgical History:   Procedure Laterality Date    Adenoidectomy      Anterior cruciate ligament repair Left       2021    Excision turbinate  2011    Performed by KATHY SHAVER at [facility]    Excision turbinate  2011    Performed by KATHY SHAVER at [facility]    Reconstr nose,complete+external  2011    Performed by KATHY SHAVER at [facility]    Removal adenoids,primary,12+ y/o N/A 2016    Procedure: ADENOIDECTOMY;  Surgeon: Jamey Ramirez MD;  Location: [facility]    Remv cartilage for graft nasal  2011    Performed by KATHY SHAVER at [facility], [facility]    Repair nasal stenosis  2011    Performed by KATHY SHAVER at [facility]    Repair of nasal septum  2011    Performed by [facility]ATH[facility] SHAVER at [facility]    Sinus surgery           Social History     Socioeconomic History    Marital status:    Tobacco Use    Smoking status: Never    Smokeless tobacco: Never   Vaping Use    Vaping status: Never Used   Substance and Sexual Activity    Alcohol use: Not Currently    Drug use: No    Sexual activity: Yes     Partners: Male     Social Drivers of Health     Food Insecurity: No Food Insecurity (8/7/2023)    Food Insecurity     Food Insecurity: Never true   Transportation Needs: No Transportation Needs (8/7/2023)    Transportation Needs     Lack of Transportation: No   Stress: No Stress Concern Present (8/7/2023)    Stress     Feeling of Stress : No   Housing Stability: Low Risk  (8/7/2023)    Housing Stability     Housing Instability: No         Vitals:    02/27/25 0730   BP: 110/82   Pulse: 60   Resp: 14   Temp: 97.9 °F (36.6 °C)       PHYSICAL EXAM  GENERAL:  Alert and in no acute distress.  Well groomed and appropriately dressed.  SKIN: Right shoulder- suspect angioma. Right abdomen- flat skin lesion with three darker spots centralized, L thigh- pink flat lesion with slightly irregular borders    Labs:   No visits with results within 1 Week(s) from this visit.   Latest known visit with results is:   Nurse Only on 02/13/2024   Component Date Value Ref Range Status    Cholesterol, Total 02/13/2024 184  <200 mg/dL Final    HDL Cholesterol 02/13/2024 73 (H)  40 - 59 mg/dL Final    Triglycerides 02/13/2024 50  30 - 149 mg/dL Final    LDL Cholesterol 02/13/2024 101 (H)  <100 mg/dL Final    VLDL 02/13/2024 8  0 - 30 mg/dL Final    Non HDL Chol 02/13/2024 111  <130 mg/dL Final    Patient Fasting for Lipid? 02/13/2024 Yes   Final    Glucose 02/13/2024 96  70 - 99 mg/dL Final    Patient Fasting for Glucose? 02/13/2024 Yes   Final    WBC 02/13/2024 4.9  4.0 - 11.0 x10(3) uL Final    RBC 02/13/2024 4.68  3.80 - 5.30 x10(6)uL Final    HGB 02/13/2024 14.1  12.0 - 16.0 g/dL Final    HCT 02/13/2024 42.4  35.0 -  48.0 % Final    PLT 02/13/2024 274.0  150.0 - 450.0 10(3)uL Final    MCV 02/13/2024 90.6  80.0 - 100.0 fL Final    MCH 02/13/2024 30.1  26.0 - 34.0 pg Final    MCHC 02/13/2024 33.3  31.0 - 37.0 g/dL Final    RDW 02/13/2024 12.4  % Final    Neutrophil Absolute Prelim 02/13/2024 2.35  1.50 - 7.70 x10 (3) uL Final    Neutrophil Absolute 02/13/2024 2.35  1.50 - 7.70 x10(3) uL Final    Lymphocyte Absolute 02/13/2024 1.92  1.00 - 4.00 x10(3) uL Final    Monocyte Absolute 02/13/2024 0.43  0.10 - 1.00 x10(3) uL Final    Eosinophil Absolute 02/13/2024 0.13  0.00 - 0.70 x10(3) uL Final    Basophil Absolute 02/13/2024 0.05  0.00 - 0.20 x10(3) uL Final    Immature Granulocyte Absolute 02/13/2024 0.01  0.00 - 1.00 x10(3) uL Final    Neutrophil % 02/13/2024 48.0  % Final    Lymphocyte % 02/13/2024 39.3  % Final    Monocyte % 02/13/2024 8.8  % Final    Eosinophil % 02/13/2024 2.7  % Final    Basophil % 02/13/2024 1.0  % Final    Immature Granulocyte % 02/13/2024 0.2  % Final       ASSESSMENT/ PLAN  1. Skin lesion on examination  Recommend that she see derm for full skin exam.   - DERM - INTERNAL    2. Impulsive  3. Anxiety  I suspect that she would benefit from therapy but also we can try to address medication wise too. 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 Will trial wellbutrin. Discussed possible SE and time to improvement. Follow up here 1-3 months based on SE and improvement.  - [facility] Referral - In Network      Patient expresses understanding and agreement with above plan.  Tod Dietz PA-C

## 2025-03-06 ENCOUNTER — TELEPHONE (OUTPATIENT)
Age: 40
End: 2025-03-06

## 2025-03-06 NOTE — TELEPHONE ENCOUNTER
Good jillian Harrell,    Here are some therapy/psychiatry resources that may be a good fit for you. Based on their online profiles, they are in network with your current insurance. Please verify your insurance coverage with any providers that you may choose to call and schedule with directly. If you have any other questions, please give me a call at [phone]. If you need more immediate assistance, or assistance outside of business hours, please contact the [facility] 24/7 helpline at [phone].    Psychiatry Providers open through [facility]:    JEFFREY Knutson  [address] [place] [address]   Phone: [phone]    Practices with Therapy/Psychiatry/Testing Services:    [facility]  [address] [facility] [address]  [facility] [address]  Phone: [phone]  [email or web address]     [facility]  [address] [facility] [address]  Phone: [phone]  [email or web address]    Practices with Therapy/Testing Services:    [facility]  [address] [place] [address]  Phone: [phone]  [email or web address]    Therapy Only Providers:     [facility]  [address] [facility] [address]  Phone: [phone]  [email or web address]    [facility]  [address] [address] [facility]  [address]  [facility] [address]  Phone: [phone]  [email or web address]    [facility]  [address]  [address]  [facility] [address]  Phone: [phone]  [email or web address]

## 2025-03-07 ENCOUNTER — TELEPHONE (OUTPATIENT)
Age: 40
End: 2025-03-07

## 2025-03-10 ENCOUNTER — TELEPHONE (OUTPATIENT)
Age: 40
End: 2025-03-10

## 2025-03-16 ENCOUNTER — PATIENT MESSAGE (OUTPATIENT)
Dept: FAMILY MEDICINE CLINIC | Facility: CLINIC | Age: 40
End: 2025-03-16

## 2025-03-19 ENCOUNTER — TELEPHONE (OUTPATIENT)
Dept: FAMILY MEDICINE CLINIC | Facility: CLINIC | Age: 40
End: 2025-03-19

## 2025-03-19 RX ORDER — METHOCARBAMOL 500 MG/1
TABLET, FILM COATED ORAL 4 TIMES DAILY PRN
Qty: 30 TABLET | Refills: 0 | Status: SHIPPED | OUTPATIENT
Start: 2025-03-19 | End: 2025-03-24

## 2025-03-19 NOTE — TELEPHONE ENCOUNTER
Patient calling regarding what to do for her pinched nerve.     \"Hi Tod or Team     I called the office yesterday to be in touch about this pinched nerve. Should I plan to make an appointment or go to urgent care? I would prefer to be seen or have a plan or action with my provider team. Tod mentioned muscle relaxer in a previous message and was hoping that could be a first step to offer some relief. \"     Thanks for any help,  Sharri

## 2025-03-24 RX ORDER — METHOCARBAMOL 500 MG/1
TABLET, FILM COATED ORAL 4 TIMES DAILY PRN
Qty: 30 TABLET | Refills: 0 | Status: CANCELLED | OUTPATIENT
Start: 2025-03-24

## 2025-03-24 RX ORDER — METHOCARBAMOL 500 MG/1
TABLET, FILM COATED ORAL 4 TIMES DAILY PRN
Qty: 30 TABLET | Refills: 0 | Status: SHIPPED | OUTPATIENT
Start: 2025-03-24

## 2025-03-26 ENCOUNTER — OFFICE VISIT (OUTPATIENT)
Dept: FAMILY MEDICINE CLINIC | Facility: CLINIC | Age: 40
End: 2025-03-26
Payer: COMMERCIAL

## 2025-03-26 ENCOUNTER — HOSPITAL ENCOUNTER (OUTPATIENT)
Dept: GENERAL RADIOLOGY | Age: 40
Discharge: HOME | End: 2025-03-26
Attending: PHYSICIAN ASSISTANT
Payer: COMMERCIAL

## 2025-03-26 VITALS
WEIGHT: 133 LBS | BODY MASS INDEX: 23 KG/M2 | OXYGEN SATURATION: 97 % | RESPIRATION RATE: 18 BRPM | DIASTOLIC BLOOD PRESSURE: 70 MMHG | SYSTOLIC BLOOD PRESSURE: 110 MMHG | HEART RATE: 76 BPM

## 2025-03-26 DIAGNOSIS — M54.12 CERVICAL RADICULOPATHY: Primary | ICD-10-CM

## 2025-03-26 DIAGNOSIS — M54.12 CERVICAL RADICULOPATHY: ICD-10-CM

## 2025-03-26 PROCEDURE — 3074F SYST BP LT 130 MM HG: CPT | Performed by: PHYSICIAN ASSISTANT

## 2025-03-26 PROCEDURE — 99214 OFFICE O/P EST MOD 30 MIN: CPT | Performed by: PHYSICIAN ASSISTANT

## 2025-03-26 PROCEDURE — 72040 X-RAY EXAM NECK SPINE 2-3 VW: CPT | Performed by: PHYSICIAN ASSISTANT

## 2025-03-26 PROCEDURE — 3078F DIAST BP <80 MM HG: CPT | Performed by: PHYSICIAN ASSISTANT

## 2025-03-26 RX ORDER — PREDNISONE 20 MG/1
40 TABLET ORAL DAILY
Qty: 10 TABLET | Refills: 0 | Status: SHIPPED | OUTPATIENT
Start: 2025-03-26 | End: 2025-03-31

## 2025-03-26 NOTE — PROGRESS NOTES
Subjective:   Sharri Gay is a 39 year old female who presents for Pain (Pinched nerve in neck )     History/Other:   History of Present Illness  Sharri Gay is a 39 year old female who presents with neck pain and radicular symptoms.    She has been experiencing neck pain for two weeks, which began after bending down quickly to kiss her three-year-old son. The pain originates in the neck, radiates down to the mid- left arm. It is occasionally accompanied by numbness, particularly when lying down, although certain positions provide temporary relief.    She has been taking ibuprofen, three tablets every six hours, to manage the pain and potential inflammation, as muscle relaxers did not provide significant relief. She has also tried using heat and ice, finding that ice helps her sleep better at night. Various creams, including those with lidocaine and menthol, have been used to alleviate discomfort.    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 She attributes the exacerbation to standing and moving around, which might irritate the condition despite having full range of motion. She has been cautious about lifting heavy objects, especially with her left arm, and has been mindful of her posture while driving.    She has been in contact with a friend who is a physical therapist for advice on stretches and exercises to manage her symptoms. No dizziness has been experienced, but she reports minor headaches.     Chief Complaint Reviewed and Verified  Nursing Notes Reviewed and   Verified  Allergies Reviewed  Medications Reviewed  OB Status Reviewed         Tobacco:  She has never smoked tobacco.    Current Outpatient Medications   Medication Sig Dispense Refill    predniSONE 20 MG Oral Tab Take 2 tablets (40 mg total) by mouth daily for 5 days. 10 tablet 0    methocarbamol 500 MG Oral Tab Take 1-2 tablets (500-1,000 mg total) by mouth 4  (four) times daily as needed (muscle spasm). 30 tablet 0    Clindamycin Phosphate 1 % External Gel Apply 1 Application topically nightly as needed.      buPROPion  MG Oral Tablet 24 Hr Take 1 tablet (150 mg total) by mouth daily. 90 tablet 0       PHQ-9 TOTAL SCORE: (Patient-Rptd) 5  , done 3/17/2025   Feeling down, depressed, or hopeless: (Patient-Rptd) 1    Trouble falling or staying asleep, or sleeping too much: (Patient-Rptd) 2     Feeling tired or having little energy: (Patient-Rptd) 2    If you checked off any problems, how difficult have these problems made it for you to do your work, take care of things at home, or get along with other people?: (Patient-Rptd) Somewhat difficult         Objective:   /70   Pulse 76   Resp 18   Wt 133 lb (60.3 kg)   LMP 02/17/2025 (Exact Date)   SpO2 97%   BMI 22.83 kg/m²  Estimated body mass index is 22.83 kg/m² as calculated from the following:    Height as of 7/30/24: 5' 4\" (1.626 m).    Weight as of this encounter: 133 lb (60.3 kg).  Results         Physical Exam  GENERAL: Alert, cooperative, well developed, no acute distress.  HEENT: Normocephalic, normal oropharynx, moist mucous membranes.  NECK: Good range of motion, slight tension on left side near occiput. No bony tenderness or step offs.   CHEST: Clear to auscultation bilaterally, no wheezes, rhonchi, or crackles.  CARDIOVASCULAR: Normal heart rate and rhythm, S1 and S2 normal without murmurs.  ABDOMEN: Soft, non-tender, non-distended, without organomegaly, normal bowel sounds.  EXTREMITIES: No cyanosis or edema.  NEUROLOGICAL: Cranial nerves grossly intact, moves all extremities without gross motor or sensory deficit, motor strength intact in upper extremities, upper extremity strength 5/5. Normal DTR upper extremity.      Assessment & Plan:   1. Cervical radiculopathy (Primary)  -     Physical Therapy Referral - [facility]  -     XR CERVICAL SPINE (2-3 VIEWS) (CPT=72040); Future; Expected  date: 03/26/2025  Other orders  -     predniSONE; Take 2 tablets (40 mg total) by mouth daily for 5 days.  Dispense: 10 tablet; Refill: 0    Assessment & Plan  Cervical Radiculopathy  Suspected cervical radiculopathy, likely involving the C5 nerve root, with pain radiating from the neck to the arm, sometimes reaching the fingers. Symptoms include numbness and stiffness, exacerbated by certain positions and activities. No significant weakness which is reassuring. X-rays are the initial diagnostic step, with MRIs considered if symptoms persist or worsen.  - Prescribe a 5-day course of oral steroids.  - Order cervical spine X-ray.   - Refer to physical therapy as a non-invasive treatment option.  - Advise against chiropractic treatment for this condition.  - Recommend alternating heat and ice for symptom relief.  - Suggest using lidocaine or menthol-based topical creams for pain relief.  - Instruct to avoid lifting heavy objects and minimize use of the affected arm.  - Advise on proper driving posture to avoid exacerbating symptoms.        JACKY Guy, 3/26/2025, 2:40 PM

## 2025-03-26 NOTE — PROGRESS NOTES
The following individual(s) verbally consented to be recorded using ambient AI listening technology and understand that they can each withdraw their consent to this listening technology at any point by asking the clinician to turn off or pause the recording:    Patient name: Sharri Charles Deidra

## 2025-04-23 ENCOUNTER — LAB REQUISITION (OUTPATIENT)
Dept: LAB | Facility: HOSPITAL | Age: 40
End: 2025-04-23
Payer: COMMERCIAL

## 2025-04-23 DIAGNOSIS — D48.5 NEOPLASM OF UNCERTAIN BEHAVIOR OF SKIN: ICD-10-CM

## 2025-04-23 PROCEDURE — 88305 TISSUE EXAM BY PATHOLOGIST: CPT | Performed by: PHYSICIAN ASSISTANT

## 2025-05-06 ENCOUNTER — HOSPITAL ENCOUNTER (OUTPATIENT)
Dept: MRI IMAGING | Age: 40
Discharge: HOME | End: 2025-05-06
Attending: PHYSICIAN ASSISTANT
Payer: COMMERCIAL

## 2025-05-06 DIAGNOSIS — M54.12 CERVICAL RADICULOPATHY: ICD-10-CM

## 2025-05-06 PROCEDURE — 72141 MRI NECK SPINE W/O DYE: CPT | Performed by: PHYSICIAN ASSISTANT

## 2025-05-24 RX ORDER — BUPROPION HYDROCHLORIDE 150 MG/1
150 TABLET ORAL DAILY
Qty: 90 TABLET | Refills: 0 | Status: SHIPPED | OUTPATIENT
Start: 2025-05-24

## 2025-05-24 NOTE — TELEPHONE ENCOUNTER
Requested Prescriptions     Signed Prescriptions Disp Refills    buPROPion  MG Oral Tablet 24 Hr 90 tablet 0     Sig: Take 1 tablet (150 mg total) by mouth daily.     Authorizing Provider: THIERNO ACOSTA     Ordering User: BELEN BOBO      Refgrayson per protocol/OV notes

## 2025-05-29 ENCOUNTER — TELEPHONE (OUTPATIENT)
Dept: PHYSICAL THERAPY | Facility: HOSPITAL | Age: 40
End: 2025-05-29

## 2025-06-02 ENCOUNTER — OFFICE VISIT (OUTPATIENT)
Dept: PHYSICAL THERAPY | Age: 40
End: 2025-06-02
Attending: PHYSICIAN ASSISTANT
Payer: COMMERCIAL

## 2025-06-02 DIAGNOSIS — M54.12 CERVICAL RADICULOPATHY: Primary | ICD-10-CM

## 2025-06-02 PROCEDURE — 97161 PT EVAL LOW COMPLEX 20 MIN: CPT

## 2025-06-02 PROCEDURE — 97110 THERAPEUTIC EXERCISES: CPT

## 2025-06-02 NOTE — PROGRESS NOTES
SPINE EVALUATION:     Diagnosis:   Cervical radiculopathy Patient:  Sharri Gay (39 year old, female)        Referring Provider: Tod Dietz  Today's Date   6/2/2025    Precautions:  None   Date of Evaluation: 06/02/25  Next MD visit: 6/17/2025 Dr Gordon     PATIENT SUMMARY   Summary of chief complaints: Onset of neck pain 3/10/2025, numbness L arm  History of current condition: March 2025 was bending down to help young child when felt a pull in the neck, soon afterwards the neck pain became worse and the L arm became numb. Was put on Prednisone for 1 week with some improvement however on stopping the medication the pain and numbness returned. MD sent for x ray and MRI which relvealed an osteophyte/disc complex posterior C5/6. Symptoms have improved and are now very mild. MD wanted pt to come to PT for exercises.   Pain level: current 1 /10, at best 1 /10, at worst 10 /10  Description of symptoms: Intermittent neck pain and numbness L arm   Occupation: [employer] teacher   Leisure activities/Hobbies: Gardening, crafting, 2 young children   Prior level of function: No limitations  Current limitations: Unable to lay on either side to sleep, unable to work out.  Pt goals: To decrease neck pain and arm numbness, to be able to sleep lying on the side  Red flag signs/symptoms: Pt denies dizziness, drop attacks, dysphagia, dysarthria, diplopia; Pt denies pain that wakes in sleep, fever, recent trauma, history of CA, pain unchanged with movement/activity; Pt denies changes in bowel/bladder function, saddle anesthesia    Past medical history was reviewed with Sharri.  Significant findings include: nil of note  Imaging/Tests: X Ray, MRI C5/6 disc/osteophyte complex   Sharri  has a past medical history of Infertility, female.  She  has a past surgical history that includes repair of nasal septum (07/22/2011); excision turbinate (07/22/2011); excision turbinate (07/22/2011); repair nasal stenosis (07/22/2011);  remv cartilage for graft nasal (2011); reconstr nose,complete+external (2011); removal adenoids,primary,12+ y/o (N/A, 2016); sinus surgery  ; adenoidectomy;  (2021); and Anterior cruciate ligament repair (Left, ).    ASSESSMENT  Sharri presents to physical therapy evaluation with primary c/o Onset of neck pain 3/10/2025, numbness L arm. The results of the objective tests and measures show Forward head, increased thoracic kyphosis, deep neck flexor mm weakness, pectoral and lat muscle tightness. Functional deficits include but are not limited to Unable to lay on either side to sleep, unable to work out.. Signs and symptoms are consistent with diagnosis of Cervical radiculopathy. Pt and PT discussed evaluation findings, pathology, POC and HEP.  Pt voiced understanding and performs HEP correctly without reported pain. Skilled Physical Therapy is medically necessary to address the above impairments and reach functional goals.    OBJECTIVE:    Musculoskeletal:  Observation/Posture: decreased cervical lordosis; forward head posture; scapular abduction; increased thoracic kyphosis   Accessory Motion: Hypomobility upper thoracic spine   Palpation: Nil of note        ROM and Strength:  (* denotes performed with pain)     Cervical ROM MMT (-/5)     Flex 25 *tight 3     Ext 40 5    R L R L     Side bend 20 * tight 25 5 5     Rotation 65 65 5 5       Flexibility:  UE Flexibility R L     Upper Trap min restricted min restricted     Levator Scap min restricted min restricted     Pec Major min restricted min restricted     Scalenes min restricted min restricted     Latissimus mod restricted mod restricted     Bicep WNL WNL       Neurological:  Sensation: grossly intact to light touch MICHAEL UE/LE     Deep Tendon Reflexes:       UMN:      Moran's:       Clonus:       Babinski:       Peripheral Neurodynamic: WNL except Tightness without pain L median nerve   Balance and Functional  Mobility:  Gait: pt ambulates on level ground with normal mechanics.  Balance: SLS: EO R  , EO L            Today's Treatment and Response:   Pt education was provided on exam findings, treatment diagnosis, treatment plan, expectations, and prognosis.  Today's Treatment       6/2/2025   Spine Treatment   Therapeutic Exercise Seated cervical retraction x 10  Supine deep neck flexor training head nod with lift 10 x 10 sec hold  Supine angels with elbows bent x 5              B sh horizontal abd x 5     Neuro Re-Education Pt education posture, disc/osteophyte symptom development, pectoral and lat tightness, forward head position   Therapeutic Exercise Minutes 15   Evaluation Minutes 30   Total Time Of Timed Procedures 15   Total Time Of Service-Based Procedures 30   Total Treatment Time 45   HEP Supine angels 6-10 reps  Seated cervical retraction 6-10 reps  Supine deep neck flexor training head nod with lift 10 sec hold 6-10 reps        Patient was instructed in and issued a HEP for: Supine angels 6-10 reps  Seated cervical retraction 6-10 reps  Supine deep neck flexor training head nod with lift 10 sec hold 6-10 reps    Charges:  PT EVAL: Low Complexity, EX 1  In agreement with evaluation findings and clinical rationale, this evaluation involved LOW COMPLEXITY decision making due to no personal factors/comorbidities, 1-2 body structures involved/activity limitations, and stable symptoms as documented in the evaluation.                                                                         PLAN OF CARE:    Goals: (to be met in 5 visits)   Maintain min to no pain or numbness cervical spine and L arm  Instruct pt in scapular strengthening prone and standing   Increase deep neck flexor strength to enable pt to maintain neutral cervical posture  Increase pectoral and latissimus flexibility to enable pt to perform UE nancy stretch in standing against wall     Frequency / Duration: Patient will be seen 1 x weeklyx/week or a  total of 5  visits over a 90 day period. Treatment will include: Manual Therapy; Neuromuscular Re-education; Therapeutic Exercise; Home Exercise Program instruction    Education or treatment limitation: None   Rehab Potential: good     Neck Disability Index Score  Score: (Patient-Rptd) 10 % (6/1/2025  6:15 AM)      Patient/Family/Caregiver was advised of these findings, precautions, and treatment options and has agreed to actively participate in planning and for this course of care.    Thank you for your referral. Please co-sign or sign and return this letter via fax as soon as possible to [phone]. If you have any questions, please contact me at Dept: [phone]    Sincerely,  Electronically signed by therapist: Tish Frausto PT  Physician's certification required: Yes  I certify the need for these services furnished under this plan of treatment and while under my care.    X___________________________________________________ Date____________________    Certification From: 6/2/2025  To:8/31/2025

## 2025-06-09 ENCOUNTER — APPOINTMENT (OUTPATIENT)
Dept: PHYSICAL THERAPY | Age: 40
End: 2025-06-09
Attending: PHYSICIAN ASSISTANT
Payer: COMMERCIAL

## 2025-06-10 ENCOUNTER — TELEMEDICINE (OUTPATIENT)
Age: 40
End: 2025-06-10
Attending: PHYSICIAN ASSISTANT
Payer: COMMERCIAL

## 2025-06-10 DIAGNOSIS — F41.9 ANXIETY: ICD-10-CM

## 2025-06-10 DIAGNOSIS — Z63.0 MARITAL PROBLEMS: ICD-10-CM

## 2025-06-10 DIAGNOSIS — F32.A DEPRESSION, UNSPECIFIED DEPRESSION TYPE: Primary | ICD-10-CM

## 2025-06-10 PROCEDURE — 90837 PSYTX W PT 60 MINUTES: CPT | Performed by: SOCIAL WORKER

## 2025-06-10 SDOH — SOCIAL STABILITY - SOCIAL INSECURITY: PROBLEMS IN RELATIONSHIP WITH SPOUSE OR PARTNER: Z63.0

## 2025-06-10 NOTE — PROGRESS NOTES
[facility] Progress Note  Name: Sharri Gay  : 10/8/1985  Visit Type and Duration: Individual psychotherapy, 60 minutes   Visit Setting: Telemedicine with live, interactive video, and audio. Provider Location: [facility] or Medical Group Office    Patient understands and accepts financial responsibility for any deductible, co-insurance and/or co-pays associated with this service.       The following individual(s) verbally consented to be recorded using ambient AI listening technology and understand that they can each withdraw their consent to this listening technology at any point by asking the clinician to turn off or pause the recording:    Patient name: Sharri Gay  Additional names:  none        Reason for visit and problems addressed this visit: This writer met with Sharri for a therapy visit related to Depression  .    Subjective:       History of Present Illness  Sharri Gay is a 39 year old female who presents with anxiety and relationship stress.    She experiences significant anxiety and stress related to her relationship with her , particularly due to poor communication. A recent incident where her  did not communicate about a planned conversation left her feeling anxious and unable to complete daily tasks. This pattern of poor communication has been ongoing, contributing to her heightened anxiety.    The anxiety is debilitating, impacting her ability to focus on daily activities, such as shopping for Father's Day. She describes sitting in her car, unable to move due to overwhelming emotions.    She feels frustrated over the lack of communication and reassurance from her , which exacerbates her anxiety. He often does not provide clear communication, leaving her feeling uncertain and anxious about her relationship.    Her  has been less engaged in family activities and conversations, a change from when her relationship felt more  balanced. He used to initiate conversations and activities, but this has decreased significantly.    Her children are more attached to her, especially during bedtime, which she believes affects her 's feelings. She attributes this to being the primary caregiver while he is at work.    She has attempted to address these issues by suggesting couples therapy, but her  is resistant and does not communicate his needs or desires clearly. She is concerned that he may be experiencing depression or another emotional issue, based on her observations of his behavior and engagement.    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 She has designated Tuesdays as her personal time with a sitter for the children.         Objective:   Appearance  Behavior  Motor Activity unremarkable  unremarkable  Calm   Attitude Cooperative   Mood Frustrated   Affect Congruent   Speech normal rate, rhythm and volume   Attention/Concentration  Memory focused and attentive  intact immediate, recent, remote   Thought Process logical and sequential   Thought Content no delusions, obsessions, or other thought abnormalities   Perception without abnormalities   Orientation [x]  Person            [x]  Place            [x]  Date            [x]  Setting         Assessment & Plan  Depression  Marital issues contribute to depressive symptoms. Concerned about 's potential depression and impact on children. Feels stuck due to 's lack of participation in therapy.  - Encouraged open communication with .  - Considered discussing impact on children with couples therapist.    Anxiety  Heightened anxiety due to marital uncertainty and potential divorce. Challenges in managing anxiety despite focusing on children and well-being.  - Encouraged focus on personal well-being and quality time with children.  - Suggested mindfulness or relaxation techniques.  - Discussed  individual therapy for anxiety and coping strategies.       Patient continues to report Depression-related symptoms: Poor concentration/Indecisiveness and Anxiety-related symptoms: Difficulty controlling worry   Suicide Risk Assessment: No thought plan or intent of harming self reported. No risk of harm to self or others was observed    Clinical course and patient progress: constant  Engaged with Treatment and Responding to Interventions: yes      Visit Diagnosis:  1. Depression, unspecified depression type    2. Marital problems        Intervention:     Patient will continue to benefit from individual therapy to stabilize and improve patients mental status and functioning, quality of life and reduce symptoms. This writer will continue to work with patient at each appointment to monitor symptoms, functioning, and overall wellbeing to continue to guide treatment planning and interventions utilized.  Sharri's ability and capacity to respond to treatment: good  Barriers to treatment goals being reached: Lacks positive support  [facility] Clinician provided the following feedback, evidenced-based interventions, supportive and interpretive interventions and recommendations:  Cognitive Behavioral Therapy  Acceptance and Commitment Therapy (ACT)  Insight-oriented psychotherapy  Supportive psychotherapy  This writer will meet with the patient Bi-weekly. Next visit scheduled for 6/24/2025  Practice at home: see patient instructions and AVS    Active       Anxiety, Panic, OCD       As evidenced by elevated CORINNA score and patient report of anxiety regarding marital relationship      Long Term Goal (Progressing)       Start:  03/25/25    Expected End:  09/25/25       Reduce symptoms of anxiety and impulsivity         Patient Stated Goal (Progressing)       Start:  03/25/25    Expected End:  09/25/25       Goal Description: I want to feel better about my marriage and for things to feel relaxed.  Objectives: Reduce CORINNA score and patient  will report a more at ease home environment with family Interventions: Regularly attend sessions with clinician in order to receive supportive psychotherapy, explore thoughts that lead to feelings of insecurity and frustration.  Responsible professional:  Sharri Jolly LCSW            The 21st Century Cures Act makes medical notes like these available to patients in the interest of transparency. However, be advised this is a medical document. It is intended as peer to peer communication. It is written in medical language and may contain abbreviations or verbiage that are unfamiliar. It may appear blunt or direct. Medical documents are intended to carry relevant information, facts as evident, and the clinical opinion of the practitioner.

## 2025-06-13 ENCOUNTER — TELEPHONE (OUTPATIENT)
Facility: CLINIC | Age: 40
End: 2025-06-13

## 2025-06-18 ENCOUNTER — TELEPHONE (OUTPATIENT)
Facility: CLINIC | Age: 40
End: 2025-06-18

## 2025-06-26 ENCOUNTER — TELEMEDICINE (OUTPATIENT)
Age: 40
End: 2025-06-26
Attending: PHYSICIAN ASSISTANT
Payer: COMMERCIAL

## 2025-06-26 DIAGNOSIS — Z63.0 MARITAL PROBLEMS: ICD-10-CM

## 2025-06-26 DIAGNOSIS — F32.A DEPRESSION, UNSPECIFIED DEPRESSION TYPE: Primary | ICD-10-CM

## 2025-06-26 PROCEDURE — 90837 PSYTX W PT 60 MINUTES: CPT | Performed by: SOCIAL WORKER

## 2025-06-26 SDOH — SOCIAL STABILITY - SOCIAL INSECURITY: PROBLEMS IN RELATIONSHIP WITH SPOUSE OR PARTNER: Z63.0

## 2025-06-26 NOTE — PROGRESS NOTES
Message  Received: Today  Sharon Parham, KAITLYNN - Eunice Fritz RN  Caller: Unspecified (Yesterday,  3:48 PM)  Yes, with his LV recovered to normal we can go back to Losartan and stop entresto.    Please rx losartan 50 mg PO daily.  He can stop entresto today and start losartan tomorrow.    Garth-  Sharon              Left HIPAA compliant message that provider instructions would be sent to [facility] and to call back with further questions       [facility]  Name: Sharri Gay  : 10/8/1985  Visit Type and Duration: Individual psychotherapy, 60 minutes   Visit Setting: Telemedicine with live, interactive video, and audio. Provider Location: Outside of a [facility] or Medical Group Office    Patient understands and accepts financial responsibility for any deductible, co-insurance and/or co-pays associated with this service.       The following individual(s) verbally consented to be recorded using ambient AI listening technology and understand that they can each withdraw their consent to this listening technology at any point by asking the clinician to turn off or pause the recording:    Patient name: Sharri Gay  Additional names:  none        Reason for visit and problems addressed this visit: This writer met with Sharri for a therapy visit related to Depression  .    Subjective:       History of Present Illness  Sharri Gay is a 39 year old female who presents with relationship difficulties and emotional distress.    She is experiencing significant emotional distress due to ongoing relationship difficulties with her , Christopher, who has expressed a desire for separation and is unwilling to attend couples therapy. She feels anxious and insecure, particularly when Christopher requests space, which heightens her fears of infidelity or detachment.    She has been exploring attachment styles to understand her relationship dynamics, identifying herself as anxious and Christopher as avoidant. Christopher often avoids addressing issues, leading to communication breakdowns. She feels that Christopher does not take accountability for his role in their relationship problems, leading to frustration and confusion.    A recent incident where Christopher claimed to be sick to avoid attending a family event was perceived by her as an excuse. A conversation where Christopher expressed a desire for separation caused her significant distress and panic at the  thought of not being with her children daily.    Christopher is attending individual therapy but is resistant to couples therapy. She expresses skepticism about the effectiveness of his therapy, questioning whether his therapist is encouraging him to work on their relationship. Christopher's job involves interactions with new people, which contributes to her insecurities.    She reflects on past issues in their relationship, including an affair during Christopher's previous marriage and her own infidelity early in their relationship. She acknowledges that trust has been a longstanding issue, compounded by past events and ongoing communication challenges.         Objective:   Appearance  Behavior  Motor Activity unremarkable  unremarkable  Calm   Attitude Cooperative   Mood Frustrated   Affect Congruent   Speech normal rate, rhythm and volume   Attention/Concentration  Memory focused and attentive  intact immediate, recent, remote   Thought Process logical and sequential   Thought Content no delusions, obsessions, or other thought abnormalities   Perception without abnormalities   Orientation [x]  Person            [x]  Place            [x]  Date            [x]  Setting         Assessment & Plan  Anxiety  Chronic anxiety worsened by marital issues and fear of abandonment. Explored attachment styles to understand relationship dynamics.  - Continue individual therapy.  - Encourage self-care activities on Tuesdays.    Marital problems  Marital discord with 's desire for separation and refusal of couples therapy. Ineffective communication noted.  - Discuss couples therapy with .  - Consider  for guided conversations.  - Plan future therapy for co-parenting support if separation occurs.       Patient continues to report Depression-related symptoms: Poor concentration/Indecisiveness and Anxiety-related symptoms: Difficulty controlling worry   Suicide Risk Assessment: No thought plan or intent of harming self reported. No  risk of harm to self or others was observed     Clinical course and patient progress: constant  Engaged with Treatment and Responding to Interventions: yes      Visit Diagnosis:  1. Depression, unspecified depression type    2. Marital problems        Intervention:     Patient will continue to benefit from individual therapy to stabilize and improve patients mental status and functioning, quality of life and reduce symptoms. This writer will continue to work with patient at each appointment to monitor symptoms, functioning, and overall wellbeing to continue to guide treatment planning and interventions utilized.  Sharri's ability and capacity to respond to treatment: good  Barriers to treatment goals being reached: Lacks positive support  [facility] Clinician provided the following feedback, evidenced-based interventions, supportive and interpretive interventions and recommendations:  Cognitive Behavioral Therapy  Acceptance and Commitment Therapy (ACT)  Insight-oriented psychotherapy  Supportive psychotherapy  This writer will meet with the patient as needed. Next visit scheduled for 7/10/2025  Practice at home: see patient instructions and AVS    Active       Anxiety, Panic, OCD       As evidenced by elevated CORINNA score and patient report of anxiety regarding marital relationship      Long Term Goal (Progressing)       Start:  03/25/25    Expected End:  09/25/25       Reduce symptoms of anxiety and impulsivity         Patient Stated Goal (Progressing)       Start:  03/25/25    Expected End:  09/25/25       Goal Description: I want to feel better about my marriage and for things to feel relaxed.  Objectives: Reduce CORINNA score and patient will report a more at ease home environment with family Interventions: Regularly attend sessions with clinician in order to receive supportive psychotherapy, explore thoughts that lead to feelings of insecurity and frustration.  Responsible professional:  Sharri Jolly LCSW             The 21st Century Cures Act makes medical notes like these available to patients in the interest of transparency. However, be advised this is a medical document. It is intended as peer to peer communication. It is written in medical language and may contain abbreviations or verbiage that are unfamiliar. It may appear blunt or direct. Medical documents are intended to carry relevant information, facts as evident, and the clinical opinion of the practitioner.

## 2025-07-10 ENCOUNTER — TELEMEDICINE (OUTPATIENT)
Age: 40
End: 2025-07-10
Attending: PHYSICIAN ASSISTANT
Payer: COMMERCIAL

## 2025-07-10 DIAGNOSIS — F32.A DEPRESSION, UNSPECIFIED DEPRESSION TYPE: Primary | ICD-10-CM

## 2025-07-10 DIAGNOSIS — Z63.0 MARITAL PROBLEMS: ICD-10-CM

## 2025-07-10 DIAGNOSIS — F41.9 ANXIETY: ICD-10-CM

## 2025-07-10 SDOH — SOCIAL STABILITY - SOCIAL INSECURITY: PROBLEMS IN RELATIONSHIP WITH SPOUSE OR PARTNER: Z63.0

## 2025-07-10 NOTE — PROGRESS NOTES
[facility]  Name: Sharri Gay  : 10/8/1985  Visit Type and Duration: Individual psychotherapy, 60 minutes   Visit Setting: Telemedicine with live, interactive video, and audio. Provider Location: [facility] or [facility] Office    Patient understands and accepts financial responsibility for any deductible, co-insurance and/or co-pays associated with this service.       The following individual(s) verbally consented to be recorded using ambient AI listening technology and understand that they can each withdraw their consent to this listening technology at any point by asking the clinician to turn off or pause the recording:    Patient name: Sharri Gay  Additional names:  none        Reason for visit and problems addressed this visit: This writer met with Sharri for a therapy visit related to Anxiety and Depression  .    Subjective:       History of Present Illness  Sharri Gay is a 39 year old female who presents with concerns about emotional immaturity and relationship issues.    She feels emotionally immature, describing herself as volatile, jealous, and fearful of abandonment. She is exploring the origins of these feelings, considering childhood trauma and past experiences, and wants to improve her emotional maturity.    She experiences significant tension and communication issues in her relationship with her , Christopher. She feels anxious about potential betrayal and abandonment, struggling with trust, particularly regarding Christopher's phone use. A recent incident involved Christopher suggesting moving out after a lack of communication and perceived punishment following a weekend with friends.    She is focused on her children, aged four and two, expressing anxiety about not seeing them every day if a separation occurs. She is trying to spend as much time as possible with them, feeling pressure to maintain family activities and routines.    She is listening to  an audiobook titled 'Adult Children of Emotionally Immature Parents' and finds it resonates with her experiences. She is reflecting on her own emotional immaturity and its impact on her relationships and parenting.    She mentions her 's reluctance to communicate openly and his avoidance of accountability, which contributes to their marital issues. She feels guilt and shame for causing him sadness but also frustration at his lack of self-advocacy.         Objective:   Appearance  Behavior  Motor Activity unremarkable  unremarkable  Calm   Attitude Cooperative   Mood Frustrated   Affect Congruent   Speech normal rate, rhythm and volume   Attention/Concentration  Memory focused and attentive  intact immediate, recent, remote   Thought Process logical and sequential   Thought Content no delusions, obsessions, or other thought abnormalities   Perception without abnormalities   Orientation [x]  Person            [x]  Place            [x]  Date            [x]  Setting         Assessment & Plan  Anxiety  Chronic anxiety exacerbated by marital issues and fear of abandonment, with significant physical symptoms. Actively seeking management through self-reflection and understanding emotional responses.  - Encourage self-reflection and understanding of emotional responses.    Marital problems  Ongoing marital discord with lack of communication and trust issues. She feels guilt and shame, impacting her emotional state and concerns about children. Exploring emotional maturity's impact on the relationship.  - Continue therapy sessions to explore marital issues and develop communication strategies.  - Encourage open communication with her , focusing on setting boundaries and expressing needs.  - Support her efforts to prioritize her own emotional well-being and that of her children.       Patient continues to report Depression-related symptoms: Poor concentration/Indecisiveness and Anxiety-related symptoms: Difficulty  controlling worry   Suicide Risk Assessment: No thought plan or intent of harming self reported. No risk of harm to self or others was observed     Clinical course and patient progress: constant  Engaged with Treatment and Responding to Interventions: yes      Visit Diagnosis:  1. Depression, unspecified depression type    2. Marital problems    3. Anxiety        Intervention:     Patient will continue to benefit from individual therapy to stabilize and improve patients mental status and functioning, quality of life and reduce symptoms. This writer will continue to work with patient at each appointment to monitor symptoms, functioning, and overall wellbeing to continue to guide treatment planning and interventions utilized.  Sharri's ability and capacity to respond to treatment: good  Barriers to treatment goals being reached: Lacks positive support  [facility] Clinician provided the following feedback, evidenced-based interventions, supportive and interpretive interventions and recommendations:  Cognitive Behavioral Therapy  Acceptance and Commitment Therapy (ACT)  Insight-oriented psychotherapy  Supportive psychotherapy  This writer will meet with the patient Weekly. Next visit scheduled for 7/15/2025  Practice at home: see patient instructions and AVS    Active       Anxiety, Panic, OCD       As evidenced by elevated CORINNA score and patient report of anxiety regarding marital relationship      Long Term Goal (Progressing)       Start:  03/25/25    Expected End:  09/25/25       Reduce symptoms of anxiety and impulsivity         Patient Stated Goal (Progressing)       Start:  03/25/25    Expected End:  09/25/25       Goal Description: I want to feel better about my marriage and for things to feel relaxed.  Objectives: Reduce CORINNA score and patient will report a more at ease home environment with family Interventions: Regularly attend sessions with clinician in order to receive supportive psychotherapy, explore thoughts that  lead to feelings of insecurity and frustration.  Responsible professional:  Sharri Jolly LCSW            The 21st Century Cures Act makes medical notes like these available to patients in the interest of transparency. However, be advised this is a medical document. It is intended as peer to peer communication. It is written in medical language and may contain abbreviations or verbiage that are unfamiliar. It may appear blunt or direct. Medical documents are intended to carry relevant information, facts as evident, and the clinical opinion of the practitioner.

## 2025-07-15 ENCOUNTER — TELEMEDICINE (OUTPATIENT)
Age: 40
End: 2025-07-15
Attending: STUDENT IN AN ORGANIZED HEALTH CARE EDUCATION/TRAINING PROGRAM
Payer: COMMERCIAL

## 2025-07-15 DIAGNOSIS — Z63.0 MARITAL PROBLEMS: ICD-10-CM

## 2025-07-15 DIAGNOSIS — F32.A DEPRESSION, UNSPECIFIED DEPRESSION TYPE: Primary | ICD-10-CM

## 2025-07-15 PROCEDURE — 90837 PSYTX W PT 60 MINUTES: CPT | Performed by: SOCIAL WORKER

## 2025-07-15 SDOH — SOCIAL STABILITY - SOCIAL INSECURITY: PROBLEMS IN RELATIONSHIP WITH SPOUSE OR PARTNER: Z63.0

## 2025-07-18 NOTE — PROGRESS NOTES
[facility] Progress Note  Name: Sharri Gay  : 10/8/1985  Visit Type and Duration: Individual psychotherapy, 60 minutes   Visit Setting: Telemedicine with live, interactive video, and audio. Provider Location: [facility] Office    Patient understands and accepts financial responsibility for any deductible, co-insurance and/or co-pays associated with this service.       The following individual(s) verbally consented to be recorded using ambient AI listening technology and understand that they can each withdraw their consent to this listening technology at any point by asking the clinician to turn off or pause the recording:    Patient name: Sharri Gay  Additional names:  none        Reason for visit and problems addressed this visit: This writer met with Sharri for a therapy visit related to Anxiety  .    Subjective:       History of Present Illness  Sharri Gay is a 39 year old female who presents with anxiety and compulsive behaviors related to trust issues in her marriage.    She reports experiencing anxiety and compulsive behaviors, particularly related to trust issues in her marriage. She is aware of her spiraling thoughts but feels unable to stop them. She often fixates on the idea that her  may be unfaithful, despite having no physical evidence.    Specific incidents have heightened her anxiety, such as her 's reluctance to share passwords and his inconsistent behavior regarding wearing his wedding ring. He wears it in public but removes it at home, which she finds distressing due to past experiences with infidelity in a previous relationship.    She describes compulsive behaviors, such as checking her 's work phone, which she found in his car. She feels embarrassed about these actions but feels compelled to continue them due to her anxiety. An incident where her  was not at home for an hour without explanation further  fueled her suspicions.    She expresses concern about the impact of these issues on her family, particularly her four-year-old son, who frequently asks why his father is not joining family activities. She worries about the potential for her son to develop anxiety due to the current family dynamics.    She has been journaling her thoughts and feelings as a way to process her emotions and avoid 'word vomiting' her frustrations at her . She finds this method helpful in managing her stress.         Objective:   Appearance  Behavior  Motor Activity unremarkable  unremarkable  Calm   Attitude Cooperative   Mood Frustrated   Affect Congruent   Speech normal rate, rhythm and volume   Attention/Concentration  Memory focused and attentive  intact immediate, recent, remote   Thought Process logical and sequential   Thought Content no delusions, obsessions, or other thought abnormalities   Perception without abnormalities   Orientation [x]  Person            [x]  Place            [x]  Date            [x]  Setting         Assessment & Plan  Anxiety  Significant anxiety related to marital issues with obsessive thoughts and compulsive behaviors. Concerned about impact on children, especially her son showing distress.  - Encourage journaling for emotional expression.  - Guide communication with  about son's concerns.  - Encourage open communication with  when ready.    Depression, unspecified  Depressive symptoms linked to anxiety and marital issues. Overwhelmed by trust issues and 's withdrawal.  - Encourage self-care activities like walks and music.       Patient continues to report Depression-related symptoms: Poor concentration/Indecisiveness and Anxiety-related symptoms: Difficulty controlling worry   Suicide Risk Assessment: No thought plan or intent of harming self reported. No risk of harm to self or others was observed     Clinical course and patient progress: constant  Engaged with Treatment  and Responding to Interventions: yes      Visit Diagnosis:  1. Depression, unspecified depression type    2. Marital problems        Intervention:     Patient will continue to benefit from individual therapy to stabilize and improve patients mental status and functioning, quality of life and reduce symptoms. This writer will continue to work with patient at each appointment to monitor symptoms, functioning, and overall wellbeing to continue to guide treatment planning and interventions utilized.  Sharri's ability and capacity to respond to treatment: good  Barriers to treatment goals being reached: Lacks positive support  [facility] Clinician provided the following feedback, evidenced-based interventions, supportive and interpretive interventions and recommendations:  Cognitive Behavioral Therapy  Acceptance and Commitment Therapy (ACT)  Insight-oriented psychotherapy  Supportive psychotherapy  This writer will meet with the patient Weekly. Next visit scheduled for 7/24/2025  Practice at home: see patient instructions and AVS    Active       Anxiety, Panic, OCD       As evidenced by elevated CORINNA score and patient report of anxiety regarding marital relationship      Long Term Goal (Progressing)       Start:  03/25/25    Expected End:  09/25/25       Reduce symptoms of anxiety and impulsivity         Patient Stated Goal (Progressing)       Start:  03/25/25    Expected End:  09/25/25       Goal Description: I want to feel better about my marriage and for things to feel relaxed.  Objectives: Reduce CORINNA score and patient will report a more at ease home environment with family Interventions: Regularly attend sessions with clinician in order to receive supportive psychotherapy, explore thoughts that lead to feelings of insecurity and frustration.  Responsible professional:  Sharri Jolly LCSW            The 21st Century Cures Act makes medical notes like these available to patients in the interest of transparency.  However, be advised this is a medical document. It is intended as peer to peer communication. It is written in medical language and may contain abbreviations or verbiage that are unfamiliar. It may appear blunt or direct. Medical documents are intended to carry relevant information, facts as evident, and the clinical opinion of the practitioner.

## 2025-07-22 ENCOUNTER — LAB ENCOUNTER (OUTPATIENT)
Dept: LAB | Age: 40
End: 2025-07-22
Attending: PHYSICIAN ASSISTANT
Payer: COMMERCIAL

## 2025-07-22 DIAGNOSIS — Z00.00 LABORATORY EXAMINATION ORDERED AS PART OF A COMPLETE PHYSICAL EXAMINATION: ICD-10-CM

## 2025-07-22 LAB
ALBUMIN SERPL-MCNC: 4.8 G/DL (ref 3.2–4.8)
ALBUMIN/GLOB SERPL: 1.7 {RATIO} (ref 1–2)
ALP LIVER SERPL-CCNC: 37 U/L (ref 37–98)
ALT SERPL-CCNC: 15 U/L (ref 10–49)
ANION GAP SERPL CALC-SCNC: 9 MMOL/L (ref 0–18)
AST SERPL-CCNC: 20 U/L (ref ?–34)
BASOPHILS # BLD AUTO: 0.06 X10(3) UL (ref 0–0.2)
BASOPHILS NFR BLD AUTO: 1.1 %
BILIRUB SERPL-MCNC: 1 MG/DL (ref 0.3–1.2)
BUN BLD-MCNC: 13 MG/DL (ref 9–23)
CALCIUM BLD-MCNC: 9.5 MG/DL (ref 8.7–10.6)
CHLORIDE SERPL-SCNC: 104 MMOL/L (ref 98–112)
CO2 SERPL-SCNC: 27 MMOL/L (ref 21–32)
CREAT BLD-MCNC: 0.91 MG/DL (ref 0.55–1.02)
DEPRECATED HBV CORE AB SER IA-ACNC: 36 NG/ML (ref 50–306)
EGFRCR SERPLBLD CKD-EPI 2021: 83 ML/MIN/1.73M2 (ref 60–?)
EOSINOPHIL # BLD AUTO: 0.13 X10(3) UL (ref 0–0.7)
EOSINOPHIL NFR BLD AUTO: 2.5 %
ERYTHROCYTE [DISTWIDTH] IN BLOOD BY AUTOMATED COUNT: 12.2 %
FASTING STATUS PATIENT QL REPORTED: YES
GLOBULIN PLAS-MCNC: 2.9 G/DL (ref 2–3.5)
GLUCOSE BLD-MCNC: 86 MG/DL (ref 70–99)
HCT VFR BLD AUTO: 44.8 % (ref 35–48)
HGB BLD-MCNC: 14.7 G/DL (ref 12–16)
IMM GRANULOCYTES # BLD AUTO: 0.01 X10(3) UL (ref 0–1)
IMM GRANULOCYTES NFR BLD: 0.2 %
LYMPHOCYTES # BLD AUTO: 1.89 X10(3) UL (ref 1–4)
LYMPHOCYTES NFR BLD AUTO: 36 %
MCH RBC QN AUTO: 31.3 PG (ref 26–34)
MCHC RBC AUTO-ENTMCNC: 32.8 G/DL (ref 31–37)
MCV RBC AUTO: 95.3 FL (ref 80–100)
MONOCYTES # BLD AUTO: 0.42 X10(3) UL (ref 0.1–1)
MONOCYTES NFR BLD AUTO: 8 %
NEUTROPHILS # BLD AUTO: 2.74 X10 (3) UL (ref 1.5–7.7)
NEUTROPHILS # BLD AUTO: 2.74 X10(3) UL (ref 1.5–7.7)
NEUTROPHILS NFR BLD AUTO: 52.2 %
OSMOLALITY SERPL CALC.SUM OF ELEC: 289 MOSM/KG (ref 275–295)
PLATELET # BLD AUTO: 255 10(3)UL (ref 150–450)
POTASSIUM SERPL-SCNC: 3.7 MMOL/L (ref 3.5–5.1)
PROT SERPL-MCNC: 7.7 G/DL (ref 5.7–8.2)
RBC # BLD AUTO: 4.7 X10(6)UL (ref 3.8–5.3)
SODIUM SERPL-SCNC: 140 MMOL/L (ref 136–145)
TSI SER-ACNC: 1.43 UIU/ML (ref 0.55–4.78)
VIT B12 SERPL-MCNC: 384 PG/ML (ref 211–911)
VIT D+METAB SERPL-MCNC: 64.2 NG/ML (ref 30–100)
WBC # BLD AUTO: 5.3 X10(3) UL (ref 4–11)

## 2025-07-22 PROCEDURE — 82306 VITAMIN D 25 HYDROXY: CPT

## 2025-07-22 PROCEDURE — 84443 ASSAY THYROID STIM HORMONE: CPT

## 2025-07-22 PROCEDURE — 82607 VITAMIN B-12: CPT

## 2025-07-22 PROCEDURE — 82728 ASSAY OF FERRITIN: CPT

## 2025-07-22 PROCEDURE — 80053 COMPREHEN METABOLIC PANEL: CPT

## 2025-07-22 PROCEDURE — 36415 COLL VENOUS BLD VENIPUNCTURE: CPT

## 2025-07-22 PROCEDURE — 85025 COMPLETE CBC W/AUTO DIFF WBC: CPT

## 2025-07-24 ENCOUNTER — TELEMEDICINE (OUTPATIENT)
Age: 40
End: 2025-07-24
Attending: PHYSICIAN ASSISTANT
Payer: COMMERCIAL

## 2025-07-24 DIAGNOSIS — F32.A DEPRESSION, UNSPECIFIED DEPRESSION TYPE: Primary | ICD-10-CM

## 2025-07-24 PROCEDURE — 90837 PSYTX W PT 60 MINUTES: CPT | Performed by: SOCIAL WORKER

## 2025-07-28 NOTE — PROGRESS NOTES
[facility]  Name: Sharri Gay  : 10/8/1985  Visit Type and Duration: Individual psychotherapy, 60 minutes   Visit Setting: Telemedicine with live, interactive video, and audio. Provider Location: [facility] or [facility] Office    Patient understands and accepts financial responsibility for any deductible, co-insurance and/or co-pays associated with this service.       The following individual(s) verbally consented to be recorded using ambient AI listening technology and understand that they can each withdraw their consent to this listening technology at any point by asking the clinician to turn off or pause the recording:    Patient name: Sharri Gay  Additional names:  none        Reason for visit and problems addressed this visit: This writer met with Sharri for a therapy visit related to Depression  .    Subjective:       History of Present Illness  Sharri Gay is a 39 year old female who presents with relationship distress and associated emotional symptoms.    She experiences significant emotional distress due to ongoing relationship issues with her . Over the past week, her anxiety and feelings of insecurity have intensified, particularly triggered by her 's inconsistent wearing of his wedding ring and a missing work phone, which she suspects may indicate infidelity. Despite attempts to communicate her concerns, she feels her  is not providing reassurance.    A confrontation with her  about the missing work phone led to a heated argument, with him denying any wrongdoing and expressing frustration over her accusations. The issue of his wedding ring has also been a point of contention, although he has worn it more consistently since the argument, albeit with occasional lapses.    She describes a pattern of emotional volatility, including episodes of intense sadness and anger. She feels extremely depressed and confused about  her 's intentions, especially after he mentioned the possibility of moving out. This has exacerbated her fear of abandonment and betrayal, which she feels is being realized in her current situation.    She has a history of anxiety related to trust issues, stemming from the way her relationship with her  began. She acknowledges her role in the relationship dynamics, including past actions and current insecurities, and wants to work on these issues.    Her social interactions include a recent outing with friends and family activities, which she finds both comforting and confusing given the current state of her relationship. She seeks support from her family, though their advice is sometimes unhelpful or conflicting.    She uses alcohol to cope with stress, acknowledging it may contribute to her emotional instability. She wants to reduce alcohol consumption as part of her efforts to manage her mental health.         Objective:   Appearance  Behavior  Motor Activity unremarkable  unremarkable  Calm   Attitude Cooperative   Mood Sad and Frustrated   Affect Congruent   Speech normal rate, rhythm and volume   Attention/Concentration  Memory focused and attentive  intact immediate, recent, remote   Thought Process logical and sequential   Thought Content no delusions, obsessions, or other thought abnormalities   Perception without abnormalities   Orientation [x]  Person            [x]  Place            [x]  Date            [x]  Setting         Assessment & Plan  Depression, unspecified  Significant depressive symptoms due to marital issues and feelings of abandonment. Severe impact on daily functioning and emotional well-being. Expressed passive suicidal ideation but no intent due to children.  - Continue therapy sessions.    Anxiety  Heightened anxiety related to marital situation, leading to obsessive thoughts and reassurance-seeking behaviors. Contributing to emotional distress.  - Continue therapy to  develop coping strategies.  - Consider referral to specialized therapist if no improvement.       Patient continues to report Depression-related symptoms: Poor concentration/Indecisiveness and Anxiety-related symptoms: Difficulty controlling worry   Suicide Risk Assessment: No thought plan or intent of harming self reported. No risk of harm to self or others was observed     Clinical course and patient progress: constant  Engaged with Treatment and Responding to Interventions: yes      Visit Diagnosis:  1. Depression, unspecified depression type        Intervention:     Patient continues to report Depression-related symptoms: Poor concentration/Indecisiveness and Anxiety-related symptoms: Difficulty controlling worry   Suicide Risk Assessment: No thought plan or intent of harming self reported. No risk of harm to self or others was observed     Clinical course and patient progress: constant  Engaged with Treatment and Responding to Interventions: yes  This writer will meet with the patient Weekly. Next visit scheduled for 7/29/2025  Practice at home: see patient instructions and AVS    Active       Anxiety, Panic, OCD       As evidenced by elevated CORINNA score and patient report of anxiety regarding marital relationship      Long Term Goal (Progressing)       Start:  03/25/25    Expected End:  09/25/25       Reduce symptoms of anxiety and impulsivity         Patient Stated Goal (Progressing)       Start:  03/25/25    Expected End:  09/25/25       Goal Description: I want to feel better about my marriage and for things to feel relaxed.  Objectives: Reduce CORINNA score and patient will report a more at ease home environment with family Interventions: Regularly attend sessions with clinician in order to receive supportive psychotherapy, explore thoughts that lead to feelings of insecurity and frustration.  Responsible professional:  Sharri Jolly LCSW            The 21st Century Cures Act makes medical notes like  these available to patients in the interest of transparency. However, be advised this is a medical document. It is intended as peer to peer communication. It is written in medical language and may contain abbreviations or verbiage that are unfamiliar. It may appear blunt or direct. Medical documents are intended to carry relevant information, facts as evident, and the clinical opinion of the practitioner.

## 2025-07-29 ENCOUNTER — TELEMEDICINE (OUTPATIENT)
Age: 40
End: 2025-07-29
Attending: STUDENT IN AN ORGANIZED HEALTH CARE EDUCATION/TRAINING PROGRAM
Payer: COMMERCIAL

## 2025-07-29 DIAGNOSIS — Z63.0 MARITAL PROBLEMS: ICD-10-CM

## 2025-07-29 DIAGNOSIS — F32.A DEPRESSION, UNSPECIFIED DEPRESSION TYPE: Primary | ICD-10-CM

## 2025-07-29 PROCEDURE — 90837 PSYTX W PT 60 MINUTES: CPT | Performed by: SOCIAL WORKER

## 2025-07-29 SDOH — SOCIAL STABILITY - SOCIAL INSECURITY: PROBLEMS IN RELATIONSHIP WITH SPOUSE OR PARTNER: Z63.0

## 2025-08-05 ENCOUNTER — TELEMEDICINE (OUTPATIENT)
Age: 40
End: 2025-08-05
Attending: PHYSICIAN ASSISTANT

## 2025-08-05 DIAGNOSIS — F32.A DEPRESSION, UNSPECIFIED DEPRESSION TYPE: Primary | ICD-10-CM

## 2025-08-05 PROCEDURE — 90837 PSYTX W PT 60 MINUTES: CPT | Performed by: SOCIAL WORKER

## 2025-08-14 ENCOUNTER — HOSPITAL ENCOUNTER (EMERGENCY)
Facility: HOSPITAL | Age: 40
Discharge: HOME | End: 2025-08-14
Attending: EMERGENCY MEDICINE

## 2025-08-14 ENCOUNTER — APPOINTMENT (OUTPATIENT)
Dept: CT IMAGING | Facility: HOSPITAL | Age: 40
End: 2025-08-14
Attending: EMERGENCY MEDICINE

## 2025-08-14 ENCOUNTER — PATIENT MESSAGE (OUTPATIENT)
Dept: FAMILY MEDICINE CLINIC | Facility: CLINIC | Age: 40
End: 2025-08-14

## 2025-08-14 VITALS
SYSTOLIC BLOOD PRESSURE: 114 MMHG | TEMPERATURE: 97 F | WEIGHT: 120 LBS | DIASTOLIC BLOOD PRESSURE: 69 MMHG | HEIGHT: 65 IN | RESPIRATION RATE: 22 BRPM | OXYGEN SATURATION: 100 % | HEART RATE: 63 BPM | BODY MASS INDEX: 19.99 KG/M2

## 2025-08-14 DIAGNOSIS — N20.0 KIDNEY STONE: Primary | ICD-10-CM

## 2025-08-14 LAB
ALBUMIN SERPL-MCNC: 4.9 G/DL (ref 3.2–4.8)
ALBUMIN/GLOB SERPL: 1.7 (ref 1–2)
ALP LIVER SERPL-CCNC: 35 U/L (ref 37–98)
ALT SERPL-CCNC: 17 U/L (ref 10–49)
ANION GAP SERPL CALC-SCNC: 11 MMOL/L (ref 0–18)
AST SERPL-CCNC: 24 U/L (ref ?–34)
B-HCG UR QL: NEGATIVE
BASOPHILS # BLD AUTO: 0.08 X10(3) UL (ref 0–0.2)
BASOPHILS NFR BLD AUTO: 0.8 %
BILIRUB SERPL-MCNC: 0.5 MG/DL (ref 0.3–1.2)
BILIRUB UR QL STRIP.AUTO: NEGATIVE
BUN BLD-MCNC: 17 MG/DL (ref 9–23)
CALCIUM BLD-MCNC: 9.7 MG/DL (ref 8.7–10.6)
CHLORIDE SERPL-SCNC: 103 MMOL/L (ref 98–112)
CO2 SERPL-SCNC: 24 MMOL/L (ref 21–32)
CREAT BLD-MCNC: 0.98 MG/DL (ref 0.55–1.02)
EGFRCR SERPLBLD CKD-EPI 2021: 76 ML/MIN/1.73M2 (ref 60–?)
EOSINOPHIL # BLD AUTO: 0.16 X10(3) UL (ref 0–0.7)
EOSINOPHIL NFR BLD AUTO: 1.5 %
ERYTHROCYTE [DISTWIDTH] IN BLOOD BY AUTOMATED COUNT: 12.1 %
GLOBULIN PLAS-MCNC: 2.9 G/DL (ref 2–3.5)
GLUCOSE BLD-MCNC: 121 MG/DL (ref 70–99)
GLUCOSE UR STRIP.AUTO-MCNC: NORMAL MG/DL
HCT VFR BLD AUTO: 43.2 % (ref 35–48)
HGB BLD-MCNC: 14.4 G/DL (ref 12–16)
IMM GRANULOCYTES # BLD AUTO: 0.02 X10(3) UL (ref 0–1)
IMM GRANULOCYTES NFR BLD: 0.2 %
KETONES UR STRIP.AUTO-MCNC: 10 MG/DL
LEUKOCYTE ESTERASE UR QL STRIP.AUTO: NEGATIVE
LIPASE SERPL-CCNC: 30 U/L (ref 12–53)
LYMPHOCYTES # BLD AUTO: 3.76 X10(3) UL (ref 1–4)
LYMPHOCYTES NFR BLD AUTO: 35.9 %
MCH RBC QN AUTO: 30.8 PG (ref 26–34)
MCHC RBC AUTO-ENTMCNC: 33.3 G/DL (ref 31–37)
MCV RBC AUTO: 92.3 FL (ref 80–100)
MONOCYTES # BLD AUTO: 0.78 X10(3) UL (ref 0.1–1)
MONOCYTES NFR BLD AUTO: 7.4 %
NEUTROPHILS # BLD AUTO: 5.68 X10 (3) UL (ref 1.5–7.7)
NEUTROPHILS # BLD AUTO: 5.68 X10(3) UL (ref 1.5–7.7)
NEUTROPHILS NFR BLD AUTO: 54.2 %
NITRITE UR QL STRIP.AUTO: NEGATIVE
OSMOLALITY SERPL CALC.SUM OF ELEC: 289 MOSM/KG (ref 275–295)
PH UR STRIP.AUTO: 6 (ref 5–8)
PLATELET # BLD AUTO: 277 10(3)UL (ref 150–450)
POTASSIUM SERPL-SCNC: 4.3 MMOL/L (ref 3.5–5.1)
PROT SERPL-MCNC: 7.8 G/DL (ref 5.7–8.2)
PROT UR STRIP.AUTO-MCNC: NEGATIVE MG/DL
RBC # BLD AUTO: 4.68 X10(6)UL (ref 3.8–5.3)
RBC #/AREA URNS AUTO: >10 /HPF
SODIUM SERPL-SCNC: 138 MMOL/L (ref 136–145)
SP GR UR STRIP.AUTO: 1.03 (ref 1–1.03)
UROBILINOGEN UR STRIP.AUTO-MCNC: NORMAL MG/DL
WBC # BLD AUTO: 10.5 X10(3) UL (ref 4–11)

## 2025-08-14 PROCEDURE — 80053 COMPREHEN METABOLIC PANEL: CPT

## 2025-08-14 PROCEDURE — 83690 ASSAY OF LIPASE: CPT | Performed by: EMERGENCY MEDICINE

## 2025-08-14 PROCEDURE — 81025 URINE PREGNANCY TEST: CPT

## 2025-08-14 PROCEDURE — 85025 COMPLETE CBC W/AUTO DIFF WBC: CPT | Performed by: EMERGENCY MEDICINE

## 2025-08-14 PROCEDURE — 81001 URINALYSIS AUTO W/SCOPE: CPT | Performed by: EMERGENCY MEDICINE

## 2025-08-14 PROCEDURE — 80053 COMPREHEN METABOLIC PANEL: CPT | Performed by: EMERGENCY MEDICINE

## 2025-08-14 PROCEDURE — 99285 EMERGENCY DEPT VISIT HI MDM: CPT

## 2025-08-14 PROCEDURE — 99284 EMERGENCY DEPT VISIT MOD MDM: CPT

## 2025-08-14 PROCEDURE — 85025 COMPLETE CBC W/AUTO DIFF WBC: CPT

## 2025-08-14 PROCEDURE — 83690 ASSAY OF LIPASE: CPT

## 2025-08-14 PROCEDURE — 74176 CT ABD & PELVIS W/O CONTRAST: CPT | Performed by: EMERGENCY MEDICINE

## 2025-08-14 PROCEDURE — 96361 HYDRATE IV INFUSION ADD-ON: CPT

## 2025-08-14 PROCEDURE — 96374 THER/PROPH/DIAG INJ IV PUSH: CPT

## 2025-08-14 PROCEDURE — 81001 URINALYSIS AUTO W/SCOPE: CPT

## 2025-08-14 RX ORDER — KETOROLAC TROMETHAMINE 15 MG/ML
15 INJECTION, SOLUTION INTRAMUSCULAR; INTRAVENOUS ONCE
Status: COMPLETED | OUTPATIENT
Start: 2025-08-14 | End: 2025-08-14

## 2025-08-14 RX ORDER — TAMSULOSIN HYDROCHLORIDE 0.4 MG/1
0.4 CAPSULE ORAL DAILY
Qty: 7 CAPSULE | Refills: 0 | Status: SHIPPED | OUTPATIENT
Start: 2025-08-14 | End: 2025-08-21

## 2025-08-14 RX ORDER — ONDANSETRON 4 MG/1
4 TABLET, ORALLY DISINTEGRATING ORAL EVERY 4 HOURS PRN
Qty: 10 TABLET | Refills: 0 | Status: SHIPPED | OUTPATIENT
Start: 2025-08-14

## 2025-08-14 RX ORDER — HYDROCODONE BITARTRATE AND ACETAMINOPHEN 5; 325 MG/1; MG/1
1-2 TABLET ORAL EVERY 6 HOURS PRN
Qty: 10 TABLET | Refills: 0 | Status: SHIPPED | OUTPATIENT
Start: 2025-08-14 | End: 2025-08-19

## 2025-08-14 RX ORDER — NAPROXEN 500 MG/1
500 TABLET ORAL 2 TIMES DAILY PRN
Qty: 20 TABLET | Refills: 0 | Status: SHIPPED | OUTPATIENT
Start: 2025-08-14

## 2025-08-18 ENCOUNTER — PATIENT OUTREACH (OUTPATIENT)
Dept: CASE MANAGEMENT | Age: 40
End: 2025-08-18

## 2025-08-28 RX ORDER — BUPROPION HYDROCHLORIDE 150 MG/1
150 TABLET ORAL DAILY
Qty: 90 TABLET | Refills: 3 | Status: SHIPPED | OUTPATIENT
Start: 2025-08-28

## 2025-08-28 RX ORDER — BUPROPION HYDROCHLORIDE 150 MG/1
150 TABLET ORAL DAILY
Qty: 90 TABLET | Refills: 0 | OUTPATIENT
Start: 2025-08-28

## 2025-08-28 RX ORDER — METHOCARBAMOL 500 MG/1
TABLET, FILM COATED ORAL 4 TIMES DAILY PRN
Qty: 30 TABLET | Refills: 0 | Status: SHIPPED | OUTPATIENT
Start: 2025-08-28

## (undated) DEVICE — LARGE, DISPOSABLE ALEXIS O C-SECTION PROTECTOR - RETRACTOR: Brand: ALEXIS ® O C-SECTION PROTECTOR - RETRACTOR

## (undated) DEVICE — STAPLER SKIN INSORB 2030